# Patient Record
Sex: MALE | Race: WHITE | Employment: OTHER | URBAN - METROPOLITAN AREA
[De-identification: names, ages, dates, MRNs, and addresses within clinical notes are randomized per-mention and may not be internally consistent; named-entity substitution may affect disease eponyms.]

---

## 2020-10-15 ENCOUNTER — ANESTHESIA EVENT (OUTPATIENT)
Dept: ENDOSCOPY | Age: 70
DRG: 377 | End: 2020-10-15
Payer: MEDICARE

## 2020-10-15 ENCOUNTER — APPOINTMENT (OUTPATIENT)
Dept: CT IMAGING | Age: 70
DRG: 377 | End: 2020-10-15
Attending: EMERGENCY MEDICINE
Payer: MEDICARE

## 2020-10-15 ENCOUNTER — ANESTHESIA (OUTPATIENT)
Dept: ENDOSCOPY | Age: 70
DRG: 377 | End: 2020-10-15
Payer: MEDICARE

## 2020-10-15 ENCOUNTER — APPOINTMENT (OUTPATIENT)
Dept: GENERAL RADIOLOGY | Age: 70
DRG: 377 | End: 2020-10-15
Attending: EMERGENCY MEDICINE
Payer: MEDICARE

## 2020-10-15 ENCOUNTER — HOSPITAL ENCOUNTER (INPATIENT)
Age: 70
LOS: 5 days | Discharge: HOME | DRG: 377 | End: 2020-10-20
Attending: EMERGENCY MEDICINE | Admitting: INTERNAL MEDICINE
Payer: MEDICARE

## 2020-10-15 DIAGNOSIS — R55 SYNCOPE, UNSPECIFIED SYNCOPE TYPE: ICD-10-CM

## 2020-10-15 DIAGNOSIS — I25.5 ISCHEMIC CARDIOMYOPATHY: ICD-10-CM

## 2020-10-15 DIAGNOSIS — D64.9 SYMPTOMATIC ANEMIA: ICD-10-CM

## 2020-10-15 DIAGNOSIS — I25.10 CORONARY ARTERY DISEASE INVOLVING NATIVE HEART WITHOUT ANGINA PECTORIS, UNSPECIFIED VESSEL OR LESION TYPE: ICD-10-CM

## 2020-10-15 DIAGNOSIS — D64.9 ANEMIA, UNSPECIFIED TYPE: ICD-10-CM

## 2020-10-15 DIAGNOSIS — K92.2 GASTROINTESTINAL HEMORRHAGE, UNSPECIFIED GASTROINTESTINAL HEMORRHAGE TYPE: Primary | ICD-10-CM

## 2020-10-15 DIAGNOSIS — I25.110 CORONARY ARTERY DISEASE INVOLVING NATIVE CORONARY ARTERY OF NATIVE HEART WITH UNSTABLE ANGINA PECTORIS (HCC): ICD-10-CM

## 2020-10-15 DIAGNOSIS — K26.4 GASTROINTESTINAL HEMORRHAGE ASSOCIATED WITH DUODENAL ULCER: ICD-10-CM

## 2020-10-15 DIAGNOSIS — Z99.2 ESRD ON HEMODIALYSIS (HCC): ICD-10-CM

## 2020-10-15 DIAGNOSIS — N18.6 ESRD (END STAGE RENAL DISEASE) (HCC): ICD-10-CM

## 2020-10-15 DIAGNOSIS — N18.6 ESRD ON HEMODIALYSIS (HCC): ICD-10-CM

## 2020-10-15 PROBLEM — R42 DIZZINESS: Status: ACTIVE | Noted: 2020-10-15

## 2020-10-15 LAB
ALBUMIN SERPL-MCNC: 3 G/DL (ref 3.4–5)
ALBUMIN/GLOB SERPL: 1 {RATIO} (ref 0.8–1.7)
ALP SERPL-CCNC: 53 U/L (ref 45–117)
ALT SERPL-CCNC: 9 U/L (ref 16–61)
ANION GAP SERPL CALC-SCNC: 14 MMOL/L (ref 3–18)
APTT PPP: 25.1 SEC (ref 23–36.4)
AST SERPL-CCNC: 36 U/L (ref 10–38)
ATRIAL RATE: 93 BPM
ATRIAL RATE: 94 BPM
BASOPHILS # BLD: 0 K/UL (ref 0–0.1)
BASOPHILS NFR BLD: 0 % (ref 0–2)
BILIRUB SERPL-MCNC: 0.7 MG/DL (ref 0.2–1)
BUN SERPL-MCNC: 66 MG/DL (ref 7–18)
BUN/CREAT SERPL: 6 (ref 12–20)
CALCIUM SERPL-MCNC: 8.6 MG/DL (ref 8.5–10.1)
CALCULATED P AXIS, ECG09: 83 DEGREES
CALCULATED P AXIS, ECG09: 85 DEGREES
CALCULATED R AXIS, ECG10: -132 DEGREES
CALCULATED R AXIS, ECG10: 167 DEGREES
CALCULATED T AXIS, ECG11: -171 DEGREES
CALCULATED T AXIS, ECG11: -177 DEGREES
CHLORIDE SERPL-SCNC: 96 MMOL/L (ref 100–111)
CO2 SERPL-SCNC: 24 MMOL/L (ref 21–32)
COVID-19 RAPID TEST, COVR: NOT DETECTED
CREAT SERPL-MCNC: 10.4 MG/DL (ref 0.6–1.3)
DIAGNOSIS, 93000: NORMAL
DIAGNOSIS, 93000: NORMAL
DIFFERENTIAL METHOD BLD: ABNORMAL
EOSINOPHIL # BLD: 0 K/UL (ref 0–0.4)
EOSINOPHIL NFR BLD: 0 % (ref 0–5)
ERYTHROCYTE [DISTWIDTH] IN BLOOD BY AUTOMATED COUNT: 20.8 % (ref 11.6–14.5)
GLOBULIN SER CALC-MCNC: 3.1 G/DL (ref 2–4)
GLUCOSE BLD STRIP.AUTO-MCNC: 154 MG/DL (ref 70–110)
GLUCOSE BLD STRIP.AUTO-MCNC: 305 MG/DL (ref 70–110)
GLUCOSE BLD STRIP.AUTO-MCNC: 307 MG/DL (ref 70–110)
GLUCOSE SERPL-MCNC: 329 MG/DL (ref 74–99)
HCT VFR BLD AUTO: 20.5 % (ref 36–48)
HCT VFR BLD AUTO: 22 % (ref 36–48)
HCT VFR BLD AUTO: 23.1 % (ref 36–48)
HCT VFR BLD AUTO: 24.6 % (ref 36–48)
HEMOCCULT STL QL: POSITIVE
HGB BLD-MCNC: 6.7 G/DL (ref 13–16)
HGB BLD-MCNC: 6.9 G/DL (ref 13–16)
HGB BLD-MCNC: 7.5 G/DL (ref 13–16)
HGB BLD-MCNC: 7.8 G/DL (ref 13–16)
HISTORY CHECKED?,CKHIST: NORMAL
HISTORY CHECKED?,CKHIST: NORMAL
INR PPP: 1.2 (ref 0.8–1.2)
LIPASE SERPL-CCNC: 404 U/L (ref 73–393)
LYMPHOCYTES # BLD: 1.3 K/UL (ref 0.9–3.6)
LYMPHOCYTES NFR BLD: 17 % (ref 21–52)
MCH RBC QN AUTO: 30.7 PG (ref 24–34)
MCHC RBC AUTO-ENTMCNC: 31.4 G/DL (ref 31–37)
MCV RBC AUTO: 97.8 FL (ref 74–97)
MONOCYTES # BLD: 0.2 K/UL (ref 0.05–1.2)
MONOCYTES NFR BLD: 3 % (ref 3–10)
NEUTS SEG # BLD: 6.1 K/UL (ref 1.8–8)
NEUTS SEG NFR BLD: 80 % (ref 40–73)
P-R INTERVAL, ECG05: 156 MS
P-R INTERVAL, ECG05: 156 MS
PLATELET # BLD AUTO: 141 K/UL (ref 135–420)
PLATELET COMMENTS,PCOM: ABNORMAL
PMV BLD AUTO: 9.7 FL (ref 9.2–11.8)
POTASSIUM SERPL-SCNC: 4 MMOL/L (ref 3.5–5.5)
PROT SERPL-MCNC: 6.1 G/DL (ref 6.4–8.2)
PROTHROMBIN TIME: 14.9 SEC (ref 11.5–15.2)
Q-T INTERVAL, ECG07: 338 MS
Q-T INTERVAL, ECG07: 350 MS
QRS DURATION, ECG06: 104 MS
QRS DURATION, ECG06: 106 MS
QTC CALCULATION (BEZET), ECG08: 422 MS
QTC CALCULATION (BEZET), ECG08: 435 MS
RBC # BLD AUTO: 2.25 M/UL (ref 4.7–5.5)
RBC MORPH BLD: ABNORMAL
SODIUM SERPL-SCNC: 134 MMOL/L (ref 136–145)
SOURCE, COVRS: NORMAL
SPECIMEN TYPE, XMCV1T: NORMAL
VENTRICULAR RATE, ECG03: 93 BPM
VENTRICULAR RATE, ECG03: 94 BPM
WBC # BLD AUTO: 7.6 K/UL (ref 4.6–13.2)

## 2020-10-15 PROCEDURE — 85730 THROMBOPLASTIN TIME PARTIAL: CPT

## 2020-10-15 PROCEDURE — 00731 ANES UPR GI NDSC PX NOS: CPT | Performed by: ANESTHESIOLOGY

## 2020-10-15 PROCEDURE — 74011250637 HC RX REV CODE- 250/637: Performed by: INTERNAL MEDICINE

## 2020-10-15 PROCEDURE — 74011000250 HC RX REV CODE- 250: Performed by: ANESTHESIOLOGY

## 2020-10-15 PROCEDURE — 87635 SARS-COV-2 COVID-19 AMP PRB: CPT

## 2020-10-15 PROCEDURE — 30233N1 TRANSFUSION OF NONAUTOLOGOUS RED BLOOD CELLS INTO PERIPHERAL VEIN, PERCUTANEOUS APPROACH: ICD-10-PCS | Performed by: INTERNAL MEDICINE

## 2020-10-15 PROCEDURE — 74011250636 HC RX REV CODE- 250/636: Performed by: INTERNAL MEDICINE

## 2020-10-15 PROCEDURE — 99223 1ST HOSP IP/OBS HIGH 75: CPT | Performed by: INTERNAL MEDICINE

## 2020-10-15 PROCEDURE — 96374 THER/PROPH/DIAG INJ IV PUSH: CPT

## 2020-10-15 PROCEDURE — 83690 ASSAY OF LIPASE: CPT

## 2020-10-15 PROCEDURE — 74011636637 HC RX REV CODE- 636/637: Performed by: INTERNAL MEDICINE

## 2020-10-15 PROCEDURE — 74011636637 HC RX REV CODE- 636/637: Performed by: ANESTHESIOLOGY

## 2020-10-15 PROCEDURE — 2709999900 HC NON-CHARGEABLE SUPPLY: Performed by: INTERNAL MEDICINE

## 2020-10-15 PROCEDURE — 77030010857 HC CATH PRB GLD BSC -C: Performed by: INTERNAL MEDICINE

## 2020-10-15 PROCEDURE — 36415 COLL VENOUS BLD VENIPUNCTURE: CPT

## 2020-10-15 PROCEDURE — 85018 HEMOGLOBIN: CPT

## 2020-10-15 PROCEDURE — 76060000031 HC ANESTHESIA FIRST 0.5 HR: Performed by: INTERNAL MEDICINE

## 2020-10-15 PROCEDURE — 93005 ELECTROCARDIOGRAM TRACING: CPT

## 2020-10-15 PROCEDURE — 86900 BLOOD TYPING SEROLOGIC ABO: CPT

## 2020-10-15 PROCEDURE — 82272 OCCULT BLD FECES 1-3 TESTS: CPT

## 2020-10-15 PROCEDURE — C9113 INJ PANTOPRAZOLE SODIUM, VIA: HCPCS | Performed by: EMERGENCY MEDICINE

## 2020-10-15 PROCEDURE — C9113 INJ PANTOPRAZOLE SODIUM, VIA: HCPCS | Performed by: INTERNAL MEDICINE

## 2020-10-15 PROCEDURE — P9016 RBC LEUKOCYTES REDUCED: HCPCS

## 2020-10-15 PROCEDURE — 0W3P8ZZ CONTROL BLEEDING IN GASTROINTESTINAL TRACT, VIA NATURAL OR ARTIFICIAL OPENING ENDOSCOPIC: ICD-10-PCS | Performed by: INTERNAL MEDICINE

## 2020-10-15 PROCEDURE — 99285 EMERGENCY DEPT VISIT HI MDM: CPT

## 2020-10-15 PROCEDURE — 85025 COMPLETE CBC W/AUTO DIFF WBC: CPT

## 2020-10-15 PROCEDURE — 74011250636 HC RX REV CODE- 250/636: Performed by: ANESTHESIOLOGY

## 2020-10-15 PROCEDURE — 74011636637 HC RX REV CODE- 636/637: Performed by: EMERGENCY MEDICINE

## 2020-10-15 PROCEDURE — 82962 GLUCOSE BLOOD TEST: CPT

## 2020-10-15 PROCEDURE — 85610 PROTHROMBIN TIME: CPT

## 2020-10-15 PROCEDURE — 74011000250 HC RX REV CODE- 250: Performed by: INTERNAL MEDICINE

## 2020-10-15 PROCEDURE — 77030008565 HC TBNG SUC IRR ERBE -B: Performed by: INTERNAL MEDICINE

## 2020-10-15 PROCEDURE — 70450 CT HEAD/BRAIN W/O DYE: CPT

## 2020-10-15 PROCEDURE — 65660000004 HC RM CVT STEPDOWN

## 2020-10-15 PROCEDURE — 76040000019: Performed by: INTERNAL MEDICINE

## 2020-10-15 PROCEDURE — 74011250636 HC RX REV CODE- 250/636: Performed by: EMERGENCY MEDICINE

## 2020-10-15 PROCEDURE — 99100 ANES PT EXTEME AGE<1 YR&>70: CPT | Performed by: ANESTHESIOLOGY

## 2020-10-15 PROCEDURE — 80053 COMPREHEN METABOLIC PANEL: CPT

## 2020-10-15 PROCEDURE — 71045 X-RAY EXAM CHEST 1 VIEW: CPT

## 2020-10-15 PROCEDURE — 96375 TX/PRO/DX INJ NEW DRUG ADDON: CPT

## 2020-10-15 PROCEDURE — 36430 TRANSFUSION BLD/BLD COMPNT: CPT

## 2020-10-15 PROCEDURE — 86920 COMPATIBILITY TEST SPIN: CPT

## 2020-10-15 RX ORDER — CARVEDILOL 6.25 MG/1
6.25 TABLET ORAL 2 TIMES DAILY WITH MEALS
COMMUNITY

## 2020-10-15 RX ORDER — MAGNESIUM SULFATE 100 %
4 CRYSTALS MISCELLANEOUS AS NEEDED
Status: DISCONTINUED | OUTPATIENT
Start: 2020-10-15 | End: 2020-10-20 | Stop reason: HOSPADM

## 2020-10-15 RX ORDER — SODIUM CHLORIDE 9 MG/ML
250 INJECTION, SOLUTION INTRAVENOUS AS NEEDED
Status: DISCONTINUED | OUTPATIENT
Start: 2020-10-15 | End: 2020-10-16

## 2020-10-15 RX ORDER — DEXTROMETHORPHAN/PSEUDOEPHED 2.5-7.5/.8
DROPS ORAL AS NEEDED
Status: DISCONTINUED | OUTPATIENT
Start: 2020-10-15 | End: 2020-10-15 | Stop reason: HOSPADM

## 2020-10-15 RX ORDER — INSULIN LISPRO 100 [IU]/ML
INJECTION, SOLUTION INTRAVENOUS; SUBCUTANEOUS ONCE
Status: COMPLETED | OUTPATIENT
Start: 2020-10-15 | End: 2020-10-15

## 2020-10-15 RX ORDER — ASPIRIN 81 MG/1
81 TABLET ORAL DAILY
COMMUNITY

## 2020-10-15 RX ORDER — FLUTICASONE FUROATE AND VILANTEROL TRIFENATATE 100; 25 UG/1; UG/1
1 POWDER RESPIRATORY (INHALATION) DAILY
COMMUNITY

## 2020-10-15 RX ORDER — SODIUM CHLORIDE 0.9 % (FLUSH) 0.9 %
5-40 SYRINGE (ML) INJECTION EVERY 8 HOURS
Status: DISCONTINUED | OUTPATIENT
Start: 2020-10-15 | End: 2020-10-15 | Stop reason: HOSPADM

## 2020-10-15 RX ORDER — ACETAMINOPHEN 650 MG/1
650 SUPPOSITORY RECTAL
Status: DISCONTINUED | OUTPATIENT
Start: 2020-10-15 | End: 2020-10-20 | Stop reason: HOSPADM

## 2020-10-15 RX ORDER — CLOPIDOGREL BISULFATE 75 MG/1
75 TABLET ORAL DAILY
COMMUNITY

## 2020-10-15 RX ORDER — SODIUM CHLORIDE 0.9 % (FLUSH) 0.9 %
5-40 SYRINGE (ML) INJECTION EVERY 8 HOURS
Status: DISCONTINUED | OUTPATIENT
Start: 2020-10-15 | End: 2020-10-20 | Stop reason: HOSPADM

## 2020-10-15 RX ORDER — IPRATROPIUM BROMIDE AND ALBUTEROL SULFATE 2.5; .5 MG/3ML; MG/3ML
3 SOLUTION RESPIRATORY (INHALATION)
COMMUNITY

## 2020-10-15 RX ORDER — PROPOFOL 10 MG/ML
INJECTION, EMULSION INTRAVENOUS AS NEEDED
Status: DISCONTINUED | OUTPATIENT
Start: 2020-10-15 | End: 2020-10-15 | Stop reason: HOSPADM

## 2020-10-15 RX ORDER — INSULIN LISPRO 100 [IU]/ML
INJECTION, SOLUTION INTRAVENOUS; SUBCUTANEOUS ONCE
Status: DISCONTINUED | OUTPATIENT
Start: 2020-10-15 | End: 2020-10-15

## 2020-10-15 RX ORDER — ISOSORBIDE MONONITRATE 30 MG/1
30 TABLET, EXTENDED RELEASE ORAL DAILY
COMMUNITY

## 2020-10-15 RX ORDER — SODIUM CHLORIDE 0.9 % (FLUSH) 0.9 %
5-40 SYRINGE (ML) INJECTION AS NEEDED
Status: DISCONTINUED | OUTPATIENT
Start: 2020-10-15 | End: 2020-10-20 | Stop reason: HOSPADM

## 2020-10-15 RX ORDER — IBUPROFEN 200 MG
1 TABLET ORAL EVERY 24 HOURS
COMMUNITY

## 2020-10-15 RX ORDER — SODIUM CHLORIDE, SODIUM LACTATE, POTASSIUM CHLORIDE, CALCIUM CHLORIDE 600; 310; 30; 20 MG/100ML; MG/100ML; MG/100ML; MG/100ML
25 INJECTION, SOLUTION INTRAVENOUS CONTINUOUS
Status: DISCONTINUED | OUTPATIENT
Start: 2020-10-15 | End: 2020-10-19

## 2020-10-15 RX ORDER — IPRATROPIUM BROMIDE AND ALBUTEROL SULFATE 2.5; .5 MG/3ML; MG/3ML
3 SOLUTION RESPIRATORY (INHALATION)
Status: DISCONTINUED | OUTPATIENT
Start: 2020-10-15 | End: 2020-10-20 | Stop reason: HOSPADM

## 2020-10-15 RX ORDER — LANOLIN ALCOHOL/MO/W.PET/CERES
1000 CREAM (GRAM) TOPICAL DAILY
COMMUNITY

## 2020-10-15 RX ORDER — FAMOTIDINE 20 MG/1
20 TABLET, FILM COATED ORAL 2 TIMES DAILY
COMMUNITY
End: 2020-10-20

## 2020-10-15 RX ORDER — INSULIN LISPRO 100 [IU]/ML
INJECTION, SOLUTION INTRAVENOUS; SUBCUTANEOUS
Status: DISCONTINUED | OUTPATIENT
Start: 2020-10-15 | End: 2020-10-15

## 2020-10-15 RX ORDER — SODIUM CHLORIDE, SODIUM LACTATE, POTASSIUM CHLORIDE, CALCIUM CHLORIDE 600; 310; 30; 20 MG/100ML; MG/100ML; MG/100ML; MG/100ML
25 INJECTION, SOLUTION INTRAVENOUS CONTINUOUS
Status: DISCONTINUED | OUTPATIENT
Start: 2020-10-15 | End: 2020-10-15 | Stop reason: HOSPADM

## 2020-10-15 RX ORDER — BUDESONIDE 0.5 MG/2ML
500 INHALANT ORAL
Status: DISCONTINUED | OUTPATIENT
Start: 2020-10-15 | End: 2020-10-20 | Stop reason: HOSPADM

## 2020-10-15 RX ORDER — ATORVASTATIN CALCIUM 80 MG/1
80 TABLET, FILM COATED ORAL DAILY
COMMUNITY

## 2020-10-15 RX ORDER — POLYETHYLENE GLYCOL 3350 17 G/17G
17 POWDER, FOR SOLUTION ORAL DAILY PRN
Status: DISCONTINUED | OUTPATIENT
Start: 2020-10-15 | End: 2020-10-20 | Stop reason: HOSPADM

## 2020-10-15 RX ORDER — LIDOCAINE HYDROCHLORIDE 20 MG/ML
INJECTION, SOLUTION EPIDURAL; INFILTRATION; INTRACAUDAL; PERINEURAL AS NEEDED
Status: DISCONTINUED | OUTPATIENT
Start: 2020-10-15 | End: 2020-10-15 | Stop reason: HOSPADM

## 2020-10-15 RX ORDER — SUCRALFATE 1 G/1
1 TABLET ORAL EVERY 6 HOURS
Status: DISCONTINUED | OUTPATIENT
Start: 2020-10-15 | End: 2020-10-20 | Stop reason: HOSPADM

## 2020-10-15 RX ORDER — POLYETHYLENE GLYCOL 3350 17 G/17G
17 POWDER, FOR SOLUTION ORAL DAILY
COMMUNITY

## 2020-10-15 RX ORDER — ATORVASTATIN CALCIUM 40 MG/1
80 TABLET, FILM COATED ORAL
Status: DISCONTINUED | OUTPATIENT
Start: 2020-10-15 | End: 2020-10-20 | Stop reason: HOSPADM

## 2020-10-15 RX ORDER — LORATADINE 10 MG/1
10 TABLET ORAL DAILY
COMMUNITY

## 2020-10-15 RX ORDER — PROMETHAZINE HYDROCHLORIDE 25 MG/1
12.5 TABLET ORAL
Status: DISCONTINUED | OUTPATIENT
Start: 2020-10-15 | End: 2020-10-20 | Stop reason: HOSPADM

## 2020-10-15 RX ORDER — GUAIFENESIN 600 MG/1
600 TABLET, EXTENDED RELEASE ORAL 2 TIMES DAILY
COMMUNITY

## 2020-10-15 RX ORDER — EPINEPHRINE 0.1 MG/ML
INJECTION INTRACARDIAC; INTRAVENOUS AS NEEDED
Status: DISCONTINUED | OUTPATIENT
Start: 2020-10-15 | End: 2020-10-15 | Stop reason: HOSPADM

## 2020-10-15 RX ORDER — INSULIN GLARGINE 100 [IU]/ML
5 INJECTION, SOLUTION SUBCUTANEOUS
Status: DISCONTINUED | OUTPATIENT
Start: 2020-10-15 | End: 2020-10-19

## 2020-10-15 RX ORDER — GLUCOSAMINE/CHONDR SU A SOD 167-133 MG
2500 CAPSULE ORAL 2 TIMES DAILY WITH MEALS
COMMUNITY

## 2020-10-15 RX ORDER — INSULIN GLARGINE 100 [IU]/ML
10 INJECTION, SOLUTION SUBCUTANEOUS
COMMUNITY

## 2020-10-15 RX ORDER — CLINDAMYCIN HYDROCHLORIDE 300 MG/1
300 CAPSULE ORAL 4 TIMES DAILY
COMMUNITY
End: 2020-10-20

## 2020-10-15 RX ORDER — SODIUM CHLORIDE 0.9 % (FLUSH) 0.9 %
5-40 SYRINGE (ML) INJECTION AS NEEDED
Status: DISCONTINUED | OUTPATIENT
Start: 2020-10-15 | End: 2020-10-15 | Stop reason: HOSPADM

## 2020-10-15 RX ORDER — DEXTROSE 50 % IN WATER (D50W) INTRAVENOUS SYRINGE
25-50 AS NEEDED
Status: DISCONTINUED | OUTPATIENT
Start: 2020-10-15 | End: 2020-10-20 | Stop reason: HOSPADM

## 2020-10-15 RX ORDER — PANTOPRAZOLE SODIUM 40 MG/10ML
80 INJECTION, POWDER, LYOPHILIZED, FOR SOLUTION INTRAVENOUS
Status: COMPLETED | OUTPATIENT
Start: 2020-10-15 | End: 2020-10-15

## 2020-10-15 RX ORDER — ONDANSETRON 2 MG/ML
4 INJECTION INTRAMUSCULAR; INTRAVENOUS
Status: DISCONTINUED | OUTPATIENT
Start: 2020-10-15 | End: 2020-10-20 | Stop reason: HOSPADM

## 2020-10-15 RX ORDER — ACETAMINOPHEN 325 MG/1
650 TABLET ORAL
Status: DISCONTINUED | OUTPATIENT
Start: 2020-10-15 | End: 2020-10-20 | Stop reason: HOSPADM

## 2020-10-15 RX ORDER — INSULIN LISPRO 100 [IU]/ML
INJECTION, SOLUTION INTRAVENOUS; SUBCUTANEOUS EVERY 6 HOURS
Status: DISCONTINUED | OUTPATIENT
Start: 2020-10-16 | End: 2020-10-18

## 2020-10-15 RX ORDER — ONDANSETRON 2 MG/ML
4 INJECTION INTRAMUSCULAR; INTRAVENOUS
Status: COMPLETED | OUTPATIENT
Start: 2020-10-15 | End: 2020-10-15

## 2020-10-15 RX ADMIN — PROPOFOL 80 MG: 10 INJECTION, EMULSION INTRAVENOUS at 15:26

## 2020-10-15 RX ADMIN — ATORVASTATIN CALCIUM 80 MG: 40 TABLET, FILM COATED ORAL at 21:15

## 2020-10-15 RX ADMIN — LIDOCAINE HYDROCHLORIDE 20 MG: 20 INJECTION, SOLUTION EPIDURAL; INFILTRATION; INTRACAUDAL; PERINEURAL at 15:26

## 2020-10-15 RX ADMIN — SUCRALFATE 1 G: 1 TABLET ORAL at 21:15

## 2020-10-15 RX ADMIN — SODIUM CHLORIDE, SODIUM LACTATE, POTASSIUM CHLORIDE, AND CALCIUM CHLORIDE 25 ML/HR: 600; 310; 30; 20 INJECTION, SOLUTION INTRAVENOUS at 15:01

## 2020-10-15 RX ADMIN — SODIUM CHLORIDE 10 ML: 9 INJECTION, SOLUTION INTRAMUSCULAR; INTRAVENOUS; SUBCUTANEOUS at 21:23

## 2020-10-15 RX ADMIN — SODIUM CHLORIDE 40 MG: 9 INJECTION INTRAMUSCULAR; INTRAVENOUS; SUBCUTANEOUS at 21:12

## 2020-10-15 RX ADMIN — SODIUM CHLORIDE 10 ML: 9 INJECTION, SOLUTION INTRAMUSCULAR; INTRAVENOUS; SUBCUTANEOUS at 06:50

## 2020-10-15 RX ADMIN — SODIUM CHLORIDE 40 MG: 9 INJECTION INTRAMUSCULAR; INTRAVENOUS; SUBCUTANEOUS at 10:39

## 2020-10-15 RX ADMIN — INSULIN GLARGINE 5 UNITS: 100 INJECTION, SOLUTION SUBCUTANEOUS at 21:26

## 2020-10-15 RX ADMIN — ATORVASTATIN CALCIUM 80 MG: 40 TABLET, FILM COATED ORAL at 06:15

## 2020-10-15 RX ADMIN — Medication 10 ML: at 06:15

## 2020-10-15 RX ADMIN — PROPOFOL 80 MG: 10 INJECTION, EMULSION INTRAVENOUS at 15:35

## 2020-10-15 RX ADMIN — INSULIN LISPRO 8 UNITS: 100 INJECTION, SOLUTION INTRAVENOUS; SUBCUTANEOUS at 14:50

## 2020-10-15 RX ADMIN — PANTOPRAZOLE SODIUM 80 MG: 40 INJECTION, POWDER, FOR SOLUTION INTRAVENOUS at 05:07

## 2020-10-15 RX ADMIN — INSULIN LISPRO 8 UNITS: 100 INJECTION, SOLUTION INTRAVENOUS; SUBCUTANEOUS at 19:05

## 2020-10-15 RX ADMIN — ONDANSETRON 4 MG: 2 INJECTION INTRAMUSCULAR; INTRAVENOUS at 02:50

## 2020-10-15 NOTE — CONSULTS
[facility] - Consult Note    Consultation request by Ewa Goncalves MD for advice/opinion related to evaluating GIB (gastrointestinal bleeding) [K92.2]  Anemia [D64.9]  Dizziness [R42]    Date of  Admission: 10/15/2020  2:01 AM   Primary Care Physician:  Sherry Rodrigues DO     Assessment:     Patient Active Problem List   Diagnosis Code    Dizziness R42    Anemia D64.9    GIB (gastrointestinal bleeding) K92.2       -Acute symptomatic blood loss anemia with Hgb 6.9 requiring transfusion, presented with progressive weakness and near syncope. -GIB with occult positive stool, plan for GI evaluation with EGD today. On ASA and Plavix as outpatient. Known recent anemia with heme negative stool at [facility]. -CAD s/p CABG 1990s (LIMA to LAD, SVG to RCA, SVG to OM/PLV) with recent STEMI requiring PCI to LIMA to LAD with JHONATHAN 9/23/2020 (known chronically occluded SVG to RCA, known 30% disease at anastomotic site of SVG to OM/PLV)  -Ischemic CMY with EF 25% by echo 10/1/2020 (15% 9/23/2020, 50% 7/2020). Plan for outpatient discussion of AICD, was not fitted for lifvest.  -ESRD recently started on HD following recent heart catheterization, possible cardiorenal syndrome versus/contrast induced SKYLER/ATN in the setting of diuresis. Patient reports this is hopefully temporary.  -Chronic HFrEF. -Chronic anemia.  -Hypertension.  -Dyslipidemia. -Diabetes mellitus. -COPD. -Tobacco use. -Poor dentition. Recent extraction. Primary cardiologist Dr. Yunior Min. Plan:     Independently seen and evaluated. Agree with below. Unfortunate situation with recent intervention involving LIMA to LAD at [facility]. His EF is significantly diminished with 25% by echo on 10/1/2020. His risk of acute thrombosis of his JHONATHAN is quite high off of dual platelet anticoagulation. Because of his severe anemia and GI bleed, it is currently being held.   Dual antiplatelet therapy needs to be resumed as soon as possible in light of his recent intervention and other residual severe CAD and ischemic cardiomyopathy. Continue to transfusion watching Hgb response. Appreciate GI involvement, plan for EGD today. Need to resume plavix and ASA ASAP given recent JHONATHAN. Continue volume management with HD. Continued on statin. Resume BB when BP will allow. No ace/arb/diuretics given recent SKYLER resulting in HD which patient states is hopefully temporary. History of Present Illness: This is a 65 y.o. male admitted for GIB (gastrointestinal bleeding) [K92.2]  Anemia [D64.9]  Dizziness [R42]. Patient complains of:  Weakness, dizziness. Patient reports he has been feeling weak and tired lately. He was then getting dizzy and felt he was going to pass out. He went to ER and Hgb was 6.9 and stool was positive for blood. Patient is to have EGD today. Patient does have known CAD/CABG with recent STEMI requiring PCI to LIMA to LAD with JHONATHAN. Patient had during that admission he developed renal failure which required HD which patients states is hopefully temporary. Patient did have anemia at [facility] but stool was reportedly negative. Cardiac risk factors: smoking/ tobacco exposure, dyslipidemia, diabetes mellitus, hypertension    Cath  9/23/2020  CORONARY ANGIOGRAPHY:   LM: Severe calcification with 60 to 70% stenosis   LAD: DIFFUSE PROXIMAL DISEASE WITH TOTAL OCCLUSION AT THE ORIGIN OF THE FIRST SEPTAL . LEFT CX: 100% Totally occluded at the proximal segment  RCA: The right coronary artery is a dominant vessel totally occluded proximally. It fills partially from collaterals from the conus and marginal branch and distally from left to right collaterals. SVG to RCA-totally occluded at the origin  SVG to OM and PL branches-this is a sequential graft which is attached to OM and PLV branch and has 30% disease seen in the prior cath at the anastomotic site.   Graft is patent in the body and origin  LIMA to LAD: Widely patent at the origin and in the body however at the anastomotic site it had 100% thrombotic occlusion        Echo 10/1/2020  LIMITED STUDY FOR ASSESSMENT OF SYSTOLIC FUNCTION. DEFINITY CONTRAST UTILIZED TO ENHANCE QUALITY OF IMAGES. INCREASED LEFT VENTRICULAR CAVITY SIZE (6 CM). MILD LEFT VENTRICULAR SEPTAL HYPERTROPHY. SEVERELY REDUCED LEFT VENTRICULAR SYSTOLIC FUNCTION WITH AN ESTIMATED EJECTION FRACTION OF   25%. BEST PRESERVED FUNCTION IN THE BASAL TO MID LATERAL AND ANTERIOR WALLS WITH SEVERE   HYPOKINESIS OF ALL REMAINING SEGMENTS. SEVERELY DILATED LEFT ATRIUM. NORMAL RIGHT VENTRICULAR SIZE. MILD TO MODERATELY REDUCED RIGHT VENTRICULAR SYSTOLIC   FUNCTION BY VISUAL  ASSESSMENT. TRACE TRICUSPID REGURGITATION. ESTIMATED PULMONARY ARTERY PRESSURE IS 49 MMHG. COMPARED TO PREVIOUS ECHO PERFORMED ON 09/23/2020: EF WAS 15% AND IS NOW ESTIMATED AT 25%. RV   SYSTOLIC FUNCTION WAS LOW NORMAL AND NOW APPEARS REDUCED. PAP WAS 30 MMHG AND IS NOW 49   MMHG. Review of Symptoms:  Except as stated above include:  Constitutional:  Reports fatigue. Respiratory:  negative  Cardiovascular: C/o dizziness. Denies CP, palp, SOB. Gastrointestinal: negative  Genitourinary:  negative  Musculoskeletal:  Negative  Neurological:  Negative  Dermatological:  Negative  Endocrinological: Negative  Psychological:  Negative         Past Medical History:   History reviewed. No pertinent past medical history. Social History:     Social History     Socioeconomic History    Marital status: SINGLE     Spouse name: Not on file    Number of children: Not on file    Years of education: Not on file    Highest education level: Not on file        Family History:   History reviewed. No pertinent family history. Medications:      Allergies   Allergen Reactions    Penicillins Hives and Itching        Current Facility-Administered Medications   Medication Dose Route Frequency    0.9% sodium chloride infusion 250 mL  250 mL IntraVENous PRN    sodium chloride (NS) flush 5-40 mL  5-40 mL IntraVENous Q8H    sodium chloride (NS) flush 5-40 mL  5-40 mL IntraVENous PRN    acetaminophen (TYLENOL) tablet 650 mg  650 mg Oral Q6H PRN    Or    acetaminophen (TYLENOL) suppository 650 mg  650 mg Rectal Q6H PRN    polyethylene glycol (MIRALAX) packet 17 g  17 g Oral DAILY PRN    promethazine (PHENERGAN) tablet 12.5 mg  12.5 mg Oral Q6H PRN    Or    ondansetron (ZOFRAN) injection 4 mg  4 mg IntraVENous Q6H PRN    pantoprazole (PROTONIX) 40 mg in 0.9% sodium chloride 10 mL injection  40 mg IntraVENous Q12H    insulin lispro (HUMALOG) injection   SubCUTAneous AC&HS    glucose chewable tablet 16 g  4 Tab Oral PRN    glucagon (GLUCAGEN) injection 1 mg  1 mg IntraMUSCular PRN    dextrose (D50W) injection syrg 12.5-25 g  25-50 mL IntraVENous PRN    atorvastatin (LIPITOR) tablet 80 mg  80 mg Oral QHS    albuterol-ipratropium (DUO-NEB) 2.5 MG-0.5 MG/3 ML  3 mL Nebulization Q4H PRN    budesonide (PULMICORT) 500 mcg/2 ml nebulizer suspension  500 mcg Nebulization BID RT     No current outpatient medications on file. Physical Exam:     Visit Vitals  BP (!) 107/51   Pulse (!) 107   Temp 97 °F (36.1 °C)   Resp 17   SpO2 98%     BP Readings from Last 3 Encounters:   10/15/20 (!) 107/51     Pulse Readings from Last 3 Encounters:   10/15/20 (!) 107     Wt Readings from Last 3 Encounters:   11/21/13 97.1 kg (214 lb)       General:  alert, cooperative, no distress, appears stated age  Neck:  no JVD  Lungs:  clear to auscultation bilaterally  Heart:  regular rate and rhythm  Abdomen:  abdomen is soft without significant tenderness, masses, organomegaly or guarding  Extremities:  extremities normal, atraumatic, no cyanosis or edema  Skin: Warm and dry.  no hyperpigmentation, vitiligo, or suspicious lesions  Neuro: alert, oriented x3, affect appropriate  Psych: non focal     Data Review:     Recent Labs     10/15/20  0600 10/15/20  0230   WBC  --  7.6   HGB 7. 8* 6.9*   HCT 24.6* 22.0*   PLT  --  141     Recent Labs     10/15/20  0230   *   K 4.0   CL 96*   CO2 24   *   BUN 66*   CREA 10.40*   CA 8.6   ALB 3.0*   ALT 9*   INR 1.2       Results for orders placed or performed during the hospital encounter of 10/15/20   EKG, 12 LEAD, INITIAL   Result Value Ref Range    Ventricular Rate 93 BPM    Atrial Rate 93 BPM    P-R Interval 156 ms    QRS Duration 104 ms    Q-T Interval 350 ms    QTC Calculation (Bezet) 435 ms    Calculated P Axis 83 degrees    Calculated R Axis -132 degrees    Calculated T Axis -171 degrees    Diagnosis       Sinus rhythm with premature atrial complexes  Right superior axis deviation  Pulmonary disease pattern  ST & T wave abnormality, consider inferior ischemia  Abnormal ECG  No previous ECGs available         All Cardiac Markers in the last 24 hours:  No results found for: CPK, CK, CKMMB, CKMB, RCK3, CKMBT, CKNDX, CKND1, SAYRA, TROPT, TROIQ, RAVEN, TROPT, TNIPOC, BNP, BNPP    Last Lipid:    Lab Results   Component Value Date/Time    Cholesterol, total 89 11/19/2013 11:33 AM    HDL Cholesterol 29 (L) 11/19/2013 11:33 AM    LDL, calculated 47.6 11/19/2013 11:33 AM    Triglyceride 62 11/19/2013 11:33 AM    CHOL/HDL Ratio 3.1 11/19/2013 11:33 AM       Signed By: MANDI Arana     October 15, 2020

## 2020-10-15 NOTE — PROGRESS NOTES
TRANSFER - IN REPORT:    Verbal report received from [facility] RN(name) on [employer]  being received from ED(unit) for ordered procedure      Report consisted of patients Situation, Background, Assessment and   Recommendations(SBAR). Information from the following report(s) SBAR, K[facility], [facility] and Recent Results was reviewed with the receiving nurse. Opportunity for questions and clarification was provided. Assessment completed upon patients arrival to unit and care assumed.

## 2020-10-15 NOTE — PROCEDURES
[facility]  [facility], [address]     Endoscopic Gastroduodenoscopy Procedure Note    Argelia Anedrson  1950  [id number]    Indication:  Melena/hematochezia     : Arthur Simmons MD    Referring Provider:  Gael Aguilera DO    Anesthesia/Sedation:  MAC anesthesia Propofol      Procedure Details     After infomed consent was obtained for the procedure, with all risks and benefits of procedure explained the patient was taken to the endoscopy suite and placed in the left lateral decubitus position. Following sequential administration of sedation as per above, the endoscope was inserted into the mouth and advanced under direct vision to third portion of the duodenum. A careful inspection was made as the gastroscope was withdrawn, including a retroflexed view of the proximal stomach; findings and interventions are described below. Findings:   Esophagus:normal  Stomach: normal   Duodenum/jejunum: duodenal ulcer w/ adherent clot oozing BRB    Therapies:  3  Cc of 1: 10k epin injected. Bicap applied and bleeding ceased     Specimens: * No specimens in log *           Complications:   None; patient tolerated the procedure well. EBL:  None. Impression:    Dudonenal Ulcer (actively bleeding)      Recommendations:  -Continue acid suppression. , -Keep NPO except ice chip, -No NSAIDS  carafate suspension  Check stool for H pylori  Hold Plavix for at least 2-4 days  Check stool for H pylori, Rx if +  Arthur Simmons MD  10/15/2020  3:50 PM

## 2020-10-15 NOTE — CONSULTS
Noted consultation for ESRD on HD MWF , orders placed, consult note to follow    65years old presented with weakness, fall, dizziness and bleeding per rectum, ac blood loss anemia ,ESRD on HD MWF, missed HD yesterday , admitted with Gi bleed. Electrolyte, acid base and volume status Do not warrant dialysis today . Defer PRBC Tx to primary service. Follow GI recs.  Formal consult note to follow    Please call with questions,    Thomes Kayser, MD [facility]  Cell [phone]  Pager: [phone]

## 2020-10-15 NOTE — PROGRESS NOTES
Patient admitted earlier today by my colleague. Status post EGD which showed a bleeding duodenal ulcer. Aspirin and Plavix are on hold. Continue n.p.o. and PPI. Nephrology and cardiology following. Resume antiplatelet therapy when okay with GI. Monitor sugars. Sliding scale insulin. Half dose of home Lantus. Monitor hemoglobin and hematocrit.

## 2020-10-15 NOTE — H&P
Admission History and Physical    Cheko Rivera is a 65 y.o. male who is being admitted. Chief Complaint   Patient presents with    Fall    Dizziness    Rectal Bleeding       Impression/Plan     65years old presented with weakness, fall, dizziness and bleeding per rectum    -Acute on chronic anemia, secondary to acute blood loss and GIB  -Melena, while on aspirin and Plavix after recent coronary stenting 2 weeks ago  -S/p STEMI with LIMA to LAD stent 2 weeks ago at [facility]  -CAD, with history of CABG 1996  -CHF, cardiomyopathy with EF of 15-25 % 2 weeks ago  -COPD  -Renal failure, recently started on dialysis, on MWF schedule, missed dialysis yesterday. -Diabetes mellitus  -Chronic anemia  -Hypertension    The patient admitted to stepdown  Monitor serial H&H and transfuse as needed  He is receiving 1 unit of blood in the ED  IV Protonix  GI consult, per ED  Nephrology was consulted  Hold aspirin and Plavix for now  Consult cardiology in a.m. regarding antiplatelets  Sliding scale insulin, while n.p.o. and holding Lantus  Bronchodilators as needed for COPD  Hold antihypertensives including Imdur, Norvasc and diuretics for borderline hypotension  Admission plan was discussed with patient    SCD for DVT prophylaxis    Admission plan was discussed with    HPI     65years old with multiple medical problems including CAD, CABG in 1996, COPD, cardiomyopathy with EF of 15-25%, diabetes mellitus who was discharged from [facility] a week ago after 2 weeks hospitalization for STEMI requiring stenting of the LIMA to LAD graft. During hospitalization he was also noted anemic requiring blood transfusion and developed renal failure requiring dialysis and was discharged on Monday Wednesday and Friday schedule. His EF was also found to have dropped to 15% and evaluation for AICD defect outpatient follow-up and repeat echo.   He presented to the emergency department at [facility] with complaint of having severe weakness, dizziness and fall with no syncope. EMS noted that he also has rectal bleeding and was found to have hemoglobin of 6.9 in ED. His hemoglobin was 8.9 and hematocrit 29.3 on 10/8 prior to discharge from [facility]. He missed his dialysis yesterday. He is being transfused 1 unit of blood in the ED. ED consulted nephrology and GI and the patient is being admitted to stepdown for further management. He was discharged from [facility] on aspirin and Plavix that he has been taking. He denies having any recent upper or lower endoscopy. Allergies   Allergen Reactions    Penicillins Hives and Itching     Family history, hypertension  Social history, denies recreational drugs    Home Medications:   Medications list per Myrna CHANDLER on 10/9/2020    aspirin EC 81 mg PO TBEC Take 1 Tab by Mouth Once a Day. Qty: 30 Tab, Refills: 0     atorvastatin (LIPITOR) 80 mg PO TABS Take 1 Tab by Mouth Every Night at Bedtime. Qty: 30 Tab, Refills: 0     Clindamycin HCl 300 mg PO CAPS Take 1 Cap by Mouth 4 Times Daily for 6 days. Qty: 24 Cap, Refills: 0     clopidogreL (PLAVIX) 75 mg PO TABS Take 1 Tab by Mouth Once a Day. Qty: 30 Tab, Refills: 0     cyanocobalamin (VITAMIN B-12) 1,000 mcg PO TABS Take 1 Tab by Mouth Once a Day. Qty: 30 Tab, Refills: 0     famotidine (PEPCID) 20 mg PO TABS Take 1 Tab by Mouth Twice Daily. Qty: 60 Tab, Refills: 0       CONTINUE these medications which have CHANGED   Details   carvediloL (COREG) 6.25 mg PO TABS Take 1 Tab by Mouth 2 Times Daily with Meals. Qty: 60 Tab, Refills: 0     insulin glargine (LANTUS VIAL) 100 unit/mL SC SOLN Inject 10 Units beneath the skin Every Night at Bedtime. Qty: 1 Vial, Refills: 0       CONTINUE these medications which have NOT CHANGED   Details   albuterol (PROVENTIL, VENTOLIN) 2.5 mg /3 mL (0.083 %) INH NEBU Take 3 mL inhaled by mouth Every 4 Hours As Needed for Wheezing (cough/wheeze).   Qty: 50 Vial, Refills: 0     albuterol (PROVENTIL, VENTOLIN) 90 mcg/actuation INH HFAA Take 1-2 Puffs inhaled by mouth Every 4 Hours As Needed for Wheezing (wheeze). Qty: 1 Inhaler, Refills: 0     fluticasone furoate-vilanteroL (BREO) 100-25 mcg/dose INH DsDv Take 1 Puff inhaled by mouth Once a Day. Qty: 1 Inhaler, Refills: 3     fluticasone propionate (FLONASE) 50 mcg/actuation NA SpSn 1 Spray by each nostril route Once a Day. Qty: 1 Bottle, Refills: 0     guaiFENesin (HUMABID;MUCINEX) 600 mg PO Ta12 Take 1 Tab by Mouth Every 12 hours. Qty: 10 Tab, Refills: 0     isosorbide mononitrate CR (IMDUR) 30 mg PO TB24 Take 1 Tab by Mouth Once a Day. Qty: 30 Tab, Refills: 1     loratadine (CLARITIN) 10 mg PO TABS Take 1 Tab by Mouth Once a Day. Qty: 30 Tab, Refills: 1     niacin (NICOBID) 500 mg PO TABS Take 2,500 mg by Mouth Twice Daily. Take 2500mg po QAM and 2500mg po QHS     nicotine (NICODERM CQ) 21 mg/24 hr TD PT24 Apply 1 Patch as directed Every 24 Hours. Qty: 30 Patch, Refills: 0     ONE-A-DAY MENS PO Take by Mouth.     polyethylene glycol (MIRALAX) 17 gram PO PwPk Take 1 Packet by Mouth Once a Day. Qty: 1 Bottle, Refills: 0       STOP taking these medications     amLODIPine (NORVASC) 5 mg PO TABS Comments:   Reason for Stopping:     furosemide (LASIX) 20 mg PO TABS Comments:   Reason for Stopping:     glyBURIDE (DIABETA) 5 mg PO TABS Comments:   Reason for Stopping:     losartan (COZAAR) 50 mg PO TABS Comments:   Reason for Stopping:     Metformin (GLUCOPHAGE) 1,000 mg PO TABS Comments:   Reason for Stopping:     potassium chloride ER (K-DUR;KLOR-CON) 10 mEq PO TbTQ tablet Comments:   Reason for Stopping    Review of Systems:   GEN  no fever or chills  CV  no chest pain, or syncope.    PULM  No cough, No wheezing, No hemoptysis  GI  no abd pain, no vomiting     no dysuria, no flank pain   M/S- no back pain, no neck pain   SKIN  no rashes, no bruising   ENDO  no temp intolerance, no polyuria, no polydypsia   NEURO  no focal weakness, no speech changes   PSYCH  no hallucinations   HEENT  no headache, , no neck pain     Physical Assessment:     Visit Vitals  /68   Pulse (!) 107   Temp 97 °F (36.1 °C)   Resp 18   SpO2 99%     Constitutional: The patient is oriented to person, place, and time. No distress. Eyes: Pale conjunctiva, no scleral icterus. Neck: No JVD present. Cardiovascular: Regular rhythm. Exam reveals no gallop and no friction rub. Pulmonary/Chest: Healed CABG scar, no stridor. No respiratory distress. has no wheezes. has no rales. Abdominal: Soft. Bowel sounds are normal. exhibits no distension. No tenderness. No rebound and no guarding. Musculoskeletal:Normal strength. exhibits no tenderness. Neurological:alert and oriented to person, place, and time. Skin: Skin is warm and dry.   Psychiatric: Memory, affect and judgment normal    Recent Results (from the past 24 hour(s))   EKG, 12 LEAD, INITIAL    Collection Time: 10/15/20  2:28 AM   Result Value Ref Range    Ventricular Rate 93 BPM    Atrial Rate 93 BPM    P-R Interval 156 ms    QRS Duration 104 ms    Q-T Interval 350 ms    QTC Calculation (Bezet) 435 ms    Calculated P Axis 83 degrees    Calculated R Axis -132 degrees    Calculated T Axis -171 degrees    Diagnosis       Sinus rhythm with premature atrial complexes  Right superior axis deviation  Pulmonary disease pattern  ST & T wave abnormality, consider inferior ischemia  Abnormal ECG  No previous ECGs available     CBC WITH AUTOMATED DIFF    Collection Time: 10/15/20  2:30 AM   Result Value Ref Range    WBC 7.6 4.6 - 13.2 K/uL    RBC 2.25 (L) 4.70 - 5.50 M/uL    HGB 6.9 (L) 13.0 - 16.0 g/dL    HCT 22.0 (L) 36.0 - 48.0 %    MCV 97.8 (H) 74.0 - 97.0 FL    MCH 30.7 24.0 - 34.0 PG    MCHC 31.4 31.0 - 37.0 g/dL    RDW 20.8 (H) 11.6 - 14.5 %    PLATELET 541 308 - 192 K/uL    MPV 9.7 9.2 - 11.8 FL    NEUTROPHILS 80 (H) 40 - 73 %    LYMPHOCYTES 17 (L) 21 - 52 %    MONOCYTES 3 3 - 10 %    EOSINOPHILS 0 0 - 5 %    BASOPHILS 0 0 - 2 % ABS. NEUTROPHILS 6.1 1.8 - 8.0 K/UL    ABS. LYMPHOCYTES 1.3 0.9 - 3.6 K/UL    ABS. MONOCYTES 0.2 0.05 - 1.2 K/UL    ABS. EOSINOPHILS 0.0 0.0 - 0.4 K/UL    ABS. BASOPHILS 0.0 0.0 - 0.1 K/UL    DF AUTOMATED      PLATELET COMMENTS ADEQUATE PLATELETS      RBC COMMENTS ANISOCYTOSIS  2+        RBC COMMENTS POLYCHROMASIA  1+        RBC COMMENTS POIKILOCYTOSIS  1+        RBC COMMENTS SCHISTOCYTES  1+        RBC COMMENTS HAILEY CELLS  1+       METABOLIC PANEL, COMPREHENSIVE    Collection Time: 10/15/20  2:30 AM   Result Value Ref Range    Sodium 134 (L) 136 - 145 mmol/L    Potassium 4.0 3.5 - 5.5 mmol/L    Chloride 96 (L) 100 - 111 mmol/L    CO2 24 21 - 32 mmol/L    Anion gap 14 3.0 - 18 mmol/L    Glucose 329 (H) 74 - 99 mg/dL    BUN 66 (H) 7.0 - 18 MG/DL    Creatinine 10.40 (H) 0.6 - 1.3 MG/DL    BUN/Creatinine ratio 6 (L) 12 - 20      GFR est AA 6 (L) >60 ml/min/1.73m2    GFR est non-AA 5 (L) >60 ml/min/1.73m2    Calcium 8.6 8.5 - 10.1 MG/DL    Bilirubin, total 0.7 0.2 - 1.0 MG/DL    ALT (SGPT) 9 (L) 16 - 61 U/L    AST (SGOT) 36 10 - 38 U/L    Alk.  phosphatase 53 45 - 117 U/L    Protein, total 6.1 (L) 6.4 - 8.2 g/dL    Albumin 3.0 (L) 3.4 - 5.0 g/dL    Globulin 3.1 2.0 - 4.0 g/dL    A-G Ratio 1.0 0.8 - 1.7     LIPASE    Collection Time: 10/15/20  2:30 AM   Result Value Ref Range    Lipase 404 (H) 73 - 393 U/L   PROTHROMBIN TIME + INR    Collection Time: 10/15/20  2:30 AM   Result Value Ref Range    Prothrombin time 14.9 11.5 - 15.2 sec    INR 1.2 0.8 - 1.2     PTT    Collection Time: 10/15/20  2:30 AM   Result Value Ref Range    aPTT 25.1 23.0 - 36.4 SEC   TYPE & SCREEN    Collection Time: 10/15/20  2:30 AM   Result Value Ref Range    Crossmatch Expiration 10/18/2020     ABO/Rh(D) A NEGATIVE     Antibody screen NEG     Unit number [id number]     Blood component type [facility]     Unit division 00     Status of unit ISSUED     Crossmatch result Compatible     Unit number [id number]     Blood component type  LR,1     Unit division 00     Status of unit ALLOCATED     Crossmatch result Compatible    OCCULT BLOOD, STOOL    Collection Time: 10/15/20  2:35 AM   Result Value Ref Range    Occult blood, stool Positive (A) NEG     RBC, ALLOCATE    Collection Time: 10/15/20  3:15 AM   Result Value Ref Range    HISTORY CHECKED?  Historical check performed          Diagnostic Studies:  CXR:   Pending

## 2020-10-15 NOTE — ANESTHESIA POSTPROCEDURE EVALUATION
Procedure(s):  ENDOSCOPY w/duodenal ulcer coagulation and epinephrine injection. MAC    Anesthesia Post Evaluation      Multimodal analgesia: multimodal analgesia used between 6 hours prior to anesthesia start to PACU discharge  Patient location during evaluation: bedside  Patient participation: complete - patient cannot participate  Level of consciousness: awake  Pain score: 1  Pain management: adequate  Airway patency: patent  Anesthetic complications: no  Cardiovascular status: acceptable  Respiratory status: acceptable  Hydration status: acceptable  Post anesthesia nausea and vomiting:  none      INITIAL Post-op Vital signs: No vitals data found for the desired time range.

## 2020-10-15 NOTE — ANESTHESIA PREPROCEDURE EVALUATION
Relevant Problems   No relevant active problems       Anesthetic History   No history of anesthetic complications            Review of Systems / Medical History  Patient summary reviewed and pertinent labs reviewed    Pulmonary  Within defined limits                 Neuro/Psych   Within defined limits           Cardiovascular    Hypertension          Past MI and CAD         GI/Hepatic/Renal         Renal disease: ESRD and dialysis       Endo/Other    Diabetes         Other Findings              Physical Exam    Airway  Mallampati: II  TM Distance: 4 - 6 cm  Neck ROM: normal range of motion   Mouth opening: Normal     Cardiovascular  Regular rate and rhythm,  S1 and S2 normal,  no murmur, click, rub, or gallop  Rhythm: regular  Rate: normal         Dental  No notable dental hx       Pulmonary  Breath sounds clear to auscultation               Abdominal  GI exam deferred       Other Findings            Anesthetic Plan    ASA: 4, emergent  Anesthesia type: MAC          Induction: Intravenous  Anesthetic plan and risks discussed with: Patient

## 2020-10-15 NOTE — CONSULTS
[email or web address]  [phone]    GASTROENTEROLOGY CONSULT      Impression:   1. GI bleed - melena for past week, STEMI with stent placement 2 weeks ago at [facility], placed on Plavix and aspirin. 3. Acute Anemia - likely due to #1, hgb 7.8 after transfusion  4. S/p STEMI with LIMA to LAD stent 2 weeks ago at [facility]  5. CAD - hx CABG 1996  6. CHF, cardiomyopathy - EF 15-25% 2 weeks ago  7. ESRD on HD, recently started  8. COPD  9. DM      Plan:     1. EGD today  2. IV PPI  3. Monitor h/h, transfuse per protocol  4. Hold anticoagulation  5. Keep NPO  6. Medical management per primary team      Chief Complaint: Upper GI bleed, acute anemia      HPI:  Zoya Alexandra is a 65 y.o. male who I am being asked to see in consultation for an opinion regarding the above. He has multiple medical problems including CAD, CABG in 1996, COPD, cardiomyopathy with EF of 15-25%, diabetes mellitus who was discharged from [facility] a week ago after 2 weeks hospitalization for STEMI requiring stenting of the LIMA to LAD graft. During hospitalization he was also noted anemic requiring blood transfusion and developed renal failure requiring dialysis and was discharged on Monday Wednesday and Friday schedule. His EF was also found to have dropped to 15% and evaluation for AICD defect outpatient follow-up and repeat echo. He was discharged from [facility] on 10/8. He presented to the ED early this morning with severe weakness, dizziness and a fall at home, no LOC. He reports dark stools for past week. He denies abdominal pain. Labs show initial hgb of 6.9, improved to 7.8 after transfusion. He denies previous scopes. He recently started dialysis for ESRD, missed this yesterday. PMH:   History reviewed. No pertinent past medical history. PSH:   History reviewed. No pertinent surgical history.     Social HX:   Social History     Socioeconomic History    Marital status: SINGLE     Spouse name: Not on file    Number of children: Not on file    Years of education: Not on file    Highest education level: Not on file   Occupational History    Not on file   Social Needs    Financial resource strain: Not on file    Food insecurity     Worry: Not on file     Inability: Not on file    Transportation needs     Medical: Not on file     Non-medical: Not on file   Tobacco Use    Smoking status: Not on file   Substance and Sexual Activity    Alcohol use: Not on file    Drug use: Not on file    Sexual activity: Not on file   Lifestyle    Physical activity     Days per week: Not on file     Minutes per session: Not on file    Stress: Not on file   Relationships    Social connections     Talks on phone: Not on file     Gets together: Not on file     Attends Taoism service: Not on file     Active member of club or organization: Not on file     Attends meetings of clubs or organizations: Not on file     Relationship status: Not on file    Intimate partner violence     Fear of current or ex partner: Not on file     Emotionally abused: Not on file     Physically abused: Not on file     Forced sexual activity: Not on file   Other Topics Concern    Not on file   Social History Narrative    Not on file       FHX:   History reviewed. No pertinent family history. Allergy:   Allergies   Allergen Reactions    Penicillins Hives and Itching       Patient Active Problem List   Diagnosis Code    Dizziness R42    Anemia D64.9    GIB (gastrointestinal bleeding) K92.2       Home Medications:     (Not in a hospital admission)      Review of Systems:     Constitutional: fatigue. Skin: No rashes, pruritis, jaundice, ulcerations, erythema. HENT: No headaches, nosebleeds, sinus pressure, rhinorrhea, sore throat. Eyes: No visual changes, blurred vision, eye pain, photophobia, jaundice. Cardiovascular: No chest pain, heart palpitations. Respiratory: No cough, SOB, wheezing, chest discomfort, orthopnea.    Gastrointestinal: Melena Genitourinary: No dysuria, bleeding, discharge, pyuria. Musculoskeletal: No weakness, arthralgias, wasting. Endo: No sweats. Heme: No bruising, easy bleeding. Allergies: As noted. Neurological: Cranial nerves intact. Alert and oriented. Gait not assessed. Psychiatric:  No anxiety, depression, hallucinations. Visit Vitals  BP (!) 101/55   Pulse (!) 117   Temp 97 °F (36.1 °C)   Resp (!) 35   SpO2 99%       Physical Assessment:     constitutional: appearance: Chronically ill appearing, in no acute distress. skin: inspection: no rashes, ulcers, icterus or other lesions; no clubbing or telangiectasias. palpation: no induration or subcutaneos nodules. eyes: inspection: normal conjunctivae and lids; no jaundice pupils: normal  ENMT: mouth: normal oral mucosa,lips and gums; poor dentition. oropharynx: normal tongue, hard and soft palate; posterior pharynx without erithema, exudate or lesions. neck: thyroid: normal size, consistency and position; no masses or tenderness. respiratory: effort: normal chest excursion; no intercostal retraction or accessory muscle use. cardiovascular: abdominal aorta: normal size and position; no bruits. palpation: PMI of normal size and position; normal rhythm; no thrill or murmurs. abdominal: abdomen: normal consistency; no tenderness or masses. hernias: no hernias appreciated. liver: normal size and consistency. spleen: not palpable. rectal: hemoccult/guaiac: not performed. musculoskeletal: grossly normal, gait not assessed, resting on stretcher   neurologic: cranial nerves: II-XII grossly normal. Pupils intact. psychiatric: judgement/insight: within normal limits. memory: within normal limits for recent and remote events. mood and affect: no evidence of depression, anxiety or agitation. orientation: oriented to time, space and person.         Basic Metabolic Profile   Recent Labs     10/15/20  0230   *   K 4.0   CL 96*   CO2 24   BUN 66*   GLU 329*   CA 8.6         CBC w/Diff    Recent Labs     10/15/20  0600 10/15/20  0230   WBC  --  7.6   RBC  --  2.25*   HGB 7.8* 6.9*   HCT 24.6* 22.0*   MCV  --  97.8*   MCH  --  30.7   MCHC  --  31.4   RDW  --  20.8*   PLT  --  141    Recent Labs     10/15/20  0230   GRANS 80*   LYMPH 17*   EOS 0        Hepatic Function   Recent Labs     10/15/20  0230   ALB 3.0*   TP 6.1*   TBILI 0.7   AP 53   LPSE 404*        Coags   Recent Labs     10/15/20  0230   PTP 14.9   INR 1.2   APTT 25.1           MANDI Holcomb. [facility] [place] 1949, [address] [facility]  Cell: [phone]  [email or web address]

## 2020-10-15 NOTE — ED TRIAGE NOTES
Patient arrived to ED via EMS after he felt dizzy and fell. Patient denies syncope and any traume from the fall. Per patient he missed his dialysis treatment yesterday. Patient has a history of ESRD and DM. Patient had a MI 2 weeks ago and one stent was placed.

## 2020-10-15 NOTE — ED NOTES
Patient A&O x4 sitting in ED bed watching TV with no signs of distress. Patient currently has no complaints of discomfort.

## 2020-10-15 NOTE — ED NOTES
The patient is tolerating the blood transfusion well. The patient currently has no complaints of discomfort.

## 2020-10-16 LAB
ALBUMIN SERPL-MCNC: 2.8 G/DL (ref 3.4–5)
ALBUMIN/GLOB SERPL: 1.2 {RATIO} (ref 0.8–1.7)
ALP SERPL-CCNC: 42 U/L (ref 45–117)
ALT SERPL-CCNC: 8 U/L (ref 16–61)
ANION GAP SERPL CALC-SCNC: 13 MMOL/L (ref 3–18)
AST SERPL-CCNC: 26 U/L (ref 10–38)
BILIRUB SERPL-MCNC: 1 MG/DL (ref 0.2–1)
BUN SERPL-MCNC: 82 MG/DL (ref 7–18)
BUN/CREAT SERPL: 7 (ref 12–20)
CALCIUM SERPL-MCNC: 8.4 MG/DL (ref 8.5–10.1)
CHLORIDE SERPL-SCNC: 100 MMOL/L (ref 100–111)
CO2 SERPL-SCNC: 25 MMOL/L (ref 21–32)
CREAT SERPL-MCNC: 11.7 MG/DL (ref 0.6–1.3)
ERYTHROCYTE [DISTWIDTH] IN BLOOD BY AUTOMATED COUNT: 21.1 % (ref 11.6–14.5)
GLOBULIN SER CALC-MCNC: 2.3 G/DL (ref 2–4)
GLUCOSE BLD STRIP.AUTO-MCNC: 152 MG/DL (ref 70–110)
GLUCOSE BLD STRIP.AUTO-MCNC: 157 MG/DL (ref 70–110)
GLUCOSE BLD STRIP.AUTO-MCNC: 168 MG/DL (ref 70–110)
GLUCOSE BLD STRIP.AUTO-MCNC: 177 MG/DL (ref 70–110)
GLUCOSE SERPL-MCNC: 145 MG/DL (ref 74–99)
HCT VFR BLD AUTO: 21 % (ref 36–48)
HCT VFR BLD AUTO: 21.9 % (ref 36–48)
HCT VFR BLD AUTO: 22.6 % (ref 36–48)
HGB BLD-MCNC: 6.9 G/DL (ref 13–16)
HGB BLD-MCNC: 7.2 G/DL (ref 13–16)
HGB BLD-MCNC: 7.4 G/DL (ref 13–16)
MCH RBC QN AUTO: 30.8 PG (ref 24–34)
MCHC RBC AUTO-ENTMCNC: 32.9 G/DL (ref 31–37)
MCV RBC AUTO: 93.6 FL (ref 74–97)
PLATELET # BLD AUTO: 150 K/UL (ref 135–420)
PMV BLD AUTO: 9.3 FL (ref 9.2–11.8)
POTASSIUM SERPL-SCNC: 3.5 MMOL/L (ref 3.5–5.5)
PROT SERPL-MCNC: 5.1 G/DL (ref 6.4–8.2)
RBC # BLD AUTO: 2.34 M/UL (ref 4.7–5.5)
SODIUM SERPL-SCNC: 138 MMOL/L (ref 136–145)
WBC # BLD AUTO: 12.5 K/UL (ref 4.6–13.2)

## 2020-10-16 PROCEDURE — 82962 GLUCOSE BLOOD TEST: CPT

## 2020-10-16 PROCEDURE — 74011000250 HC RX REV CODE- 250: Performed by: INTERNAL MEDICINE

## 2020-10-16 PROCEDURE — 85027 COMPLETE CBC AUTOMATED: CPT

## 2020-10-16 PROCEDURE — 5A1D70Z PERFORMANCE OF URINARY FILTRATION, INTERMITTENT, LESS THAN 6 HOURS PER DAY: ICD-10-PCS | Performed by: INTERNAL MEDICINE

## 2020-10-16 PROCEDURE — 94640 AIRWAY INHALATION TREATMENT: CPT

## 2020-10-16 PROCEDURE — 74011250636 HC RX REV CODE- 250/636: Performed by: INTERNAL MEDICINE

## 2020-10-16 PROCEDURE — 74011000250 HC RX REV CODE- 250: Performed by: EMERGENCY MEDICINE

## 2020-10-16 PROCEDURE — 74011250637 HC RX REV CODE- 250/637: Performed by: INTERNAL MEDICINE

## 2020-10-16 PROCEDURE — 90935 HEMODIALYSIS ONE EVALUATION: CPT

## 2020-10-16 PROCEDURE — 85018 HEMOGLOBIN: CPT

## 2020-10-16 PROCEDURE — 65660000004 HC RM CVT STEPDOWN

## 2020-10-16 PROCEDURE — C9113 INJ PANTOPRAZOLE SODIUM, VIA: HCPCS | Performed by: INTERNAL MEDICINE

## 2020-10-16 PROCEDURE — 36415 COLL VENOUS BLD VENIPUNCTURE: CPT

## 2020-10-16 PROCEDURE — 74011636637 HC RX REV CODE- 636/637: Performed by: EMERGENCY MEDICINE

## 2020-10-16 PROCEDURE — 99232 SBSQ HOSP IP/OBS MODERATE 35: CPT | Performed by: EMERGENCY MEDICINE

## 2020-10-16 PROCEDURE — 76450000000

## 2020-10-16 PROCEDURE — 80053 COMPREHEN METABOLIC PANEL: CPT

## 2020-10-16 PROCEDURE — 94762 N-INVAS EAR/PLS OXIMTRY CONT: CPT

## 2020-10-16 PROCEDURE — 2709999900 HC NON-CHARGEABLE SUPPLY

## 2020-10-16 RX ORDER — HEPARIN SODIUM 1000 [USP'U]/ML
5000 INJECTION, SOLUTION INTRAVENOUS; SUBCUTANEOUS
Status: DISCONTINUED | OUTPATIENT
Start: 2020-10-16 | End: 2020-10-20 | Stop reason: HOSPADM

## 2020-10-16 RX ORDER — METOPROLOL TARTRATE 5 MG/5ML
1.25 INJECTION INTRAVENOUS EVERY 6 HOURS
Status: DISCONTINUED | OUTPATIENT
Start: 2020-10-16 | End: 2020-10-17

## 2020-10-16 RX ADMIN — SUCRALFATE 1 G: 1 TABLET ORAL at 09:03

## 2020-10-16 RX ADMIN — METOPROLOL TARTRATE 1.25 MG: 5 INJECTION INTRAVENOUS at 18:10

## 2020-10-16 RX ADMIN — HEPARIN SODIUM 5000 UNITS: 1000 INJECTION INTRAVENOUS; SUBCUTANEOUS at 14:15

## 2020-10-16 RX ADMIN — SODIUM CHLORIDE 10 ML: 9 INJECTION, SOLUTION INTRAMUSCULAR; INTRAVENOUS; SUBCUTANEOUS at 21:56

## 2020-10-16 RX ADMIN — SODIUM CHLORIDE 40 MG: 9 INJECTION INTRAMUSCULAR; INTRAVENOUS; SUBCUTANEOUS at 21:48

## 2020-10-16 RX ADMIN — SODIUM CHLORIDE 40 MG: 9 INJECTION INTRAMUSCULAR; INTRAVENOUS; SUBCUTANEOUS at 09:02

## 2020-10-16 RX ADMIN — BUDESONIDE 500 MCG: 0.5 INHALANT RESPIRATORY (INHALATION) at 19:59

## 2020-10-16 RX ADMIN — INSULIN GLARGINE 5 UNITS: 100 INJECTION, SOLUTION SUBCUTANEOUS at 21:47

## 2020-10-16 RX ADMIN — ATORVASTATIN CALCIUM 80 MG: 40 TABLET, FILM COATED ORAL at 21:48

## 2020-10-16 RX ADMIN — SUCRALFATE 1 G: 1 TABLET ORAL at 03:40

## 2020-10-16 NOTE — PROGRESS NOTES
[email or web address]  [phone]    Gastroenterology follow up-Progress note    Impression:  1. GI bleed s/p EGD 10/15/2020 - duodenal ulcer with adherent clot oozing BRB, epi injection, Bicap applied, bleeding ceased. H pylori pending. 2. Acute anemia - 7.4 today, improving  3. S/p STEMI with LIMA to LAD stent 2 weeks ago at [facility]  4. CAD - hx CABG 1996  5. CHF, cardiomyopathy - EF 15-25% 2 weeks ago  6. ESRD on HD, recently started - needs to continue dailysis  7. COPD  8. DM    Plan:  1. Continue IV PPI and carafate  2. Monitor h/h, transfuse per protocol, monitor for recurrent bleeding  3. Hold anticoagulation  4. Await H. Pylori, if positive will treat  5. Continue ice chips today then clears as tolerated  6. Continue dialysis per nephrology  7. Medical management per primary team    Chief Complaint: Upper GI bleed, acute anemia      Subjective:  Feeling fatigued, several episodes of diarrhea, bloody per nursing staff, no drop in h/h. - likely residual from bleeding ulcer. ROS: Denies any fevers, chills, rash.      Eyes: conjunctiva normal, EOM normal   Neck: ROM normal, supple and trachea normal   Cardiovascular: heart normal, intact distal pulses, normal rate and regular rhythm   Pulmonary/Chest Wall: breath sounds normal and effort normal   Abdominal: appearance normal, bowel sounds normal and soft, non-acute, non-tender     Patient Active Problem List   Diagnosis Code    Dizziness R42    Anemia D64.9    GIB (gastrointestinal bleeding) K92.2         Visit Vitals  /60 (BP 1 Location: Left arm, BP Patient Position: At rest;Head of bed elevated (Comment degrees))   Pulse (!) 104   Temp 98 °F (36.7 °C)   Resp 18   Ht 5' 9\" (1.753 m)   Wt 88.1 kg (194 lb 3.2 oz)   SpO2 98%   BMI 28.68 kg/m²           Intake/Output Summary (Last 24 hours) at 10/16/2020 1000  Last data filed at 10/16/2020 0856  Gross per 24 hour   Intake 366.25 ml   Output    Net 366.25 ml       CBC w/Diff    Lab Results   Component Value Date/Time    WBC 12.5 10/16/2020 05:08 AM    RBC 2.34 (L) 10/16/2020 05:08 AM    HGB 7.4 (L) 10/16/2020 08:28 AM    HCT 22.6 (L) 10/16/2020 08:28 AM    MCV 93.6 10/16/2020 05:08 AM    MCH 30.8 10/16/2020 05:08 AM    MCHC 32.9 10/16/2020 05:08 AM    RDW 21.1 (H) 10/16/2020 05:08 AM     10/16/2020 05:08 AM    Lab Results   Component Value Date/Time    GRANS 80 (H) 10/15/2020 02:30 AM    LYMPH 17 (L) 10/15/2020 02:30 AM    EOS 0 10/15/2020 02:30 AM    BASOS 0 10/15/2020 02:30 AM      Basic Metabolic Profile   Recent Labs     10/16/20  0508      K 3.5      CO2 25   BUN 82*   CA 8.4*        Hepatic Function    Lab Results   Component Value Date/Time    ALB 2.8 (L) 10/16/2020 05:08 AM    TP 5.1 (L) 10/16/2020 05:08 AM    AP 42 (L) 10/16/2020 05:08 AM    No results found for: TBIL       Coags   Recent Labs     10/15/20  0230   PTP 14.9   INR 1.2   APTT 25.1               MANDI Holcomb    [facility]. [email or web address]  Phone: [phone]  Pager: [phone]

## 2020-10-16 NOTE — PROGRESS NOTES
[facility] Hospitalist Group  Progress Note    Patient: Maren Geller Age: 65 y.o. : 1950 MR#: [id number] SSN: [id number]  Date/Time: 10/16/2020    Subjective:     I saw patient during hemodialysis. Patient is laying in bed in no apparent distress, awake and alert. Denies any chest pain or shortness of breath. Denies any abdominal pain.     Assessment/Plan:     Acute blood loss anemia  GI bleed  Duodenal ulcer with active bleeding noted on EGD  Coronary artery disease, recent STEMI requiring PCI and stent  Ischemic cardiomyopathy EF of 25%  End-stage renal disease   chronic systolic congestive heart failure  Hypertension   COPD without acute exacerbation    Plan  Status post EGD, n.p.o. as per GI  Continue PPI, monitor hemoglobin and hematocrit  Restart aspirin and Plavix when okay with GI  Monitor labs  Hemodialysis as per nephrology monitor blood pressure  Will start low-dose IV Lopressor given tachycardia, with hold parameters  PT OT  Discussed with patient  I offered to call patient's family but patient declined and stated that he is keeping them informed    Case discussed with:  [x]Patient  []Family  []Nursing  []Case Management  DVT Prophylaxis:  []Lovenox  []Hep SQ  [x]SCDs  []Coumadin   []On Heparin gtt    Objective:   VS:   Visit Vitals  /70 (BP 1 Location: Left arm, BP Patient Position: At rest;Head of bed elevated (Comment degrees))   Pulse (!) 117   Temp 98.7 °F (37.1 °C)   Resp 20   Ht 5' 9\" (1.753 m)   Wt 88.1 kg (194 lb 3.2 oz)   SpO2 99%   BMI 28.68 kg/m²      Tmax/24hrs: Temp (24hrs), Av.4 °F (36.9 °C), Min:98 °F (36.7 °C), Max:98.7 °F (37.1 °C)    Input/Output:     Intake/Output Summary (Last 24 hours) at 10/16/2020 1646  Last data filed at 10/16/2020 1414  Gross per 24 hour   Intake 166.25 ml   Output 0 ml   Net 166.25 ml       General:  Awake, follows commands  Cardiovascular:  S1S2+, RRR  Pulmonary:  CTA b/l  GI:  Soft, BS+, NT, ND  Extremities: trace edema      Labs:    Recent Results (from the past 24 hour(s))   RBC, ALLOCATE    Collection Time: 10/15/20  5:45 PM   Result Value Ref Range    HISTORY CHECKED? Historical check performed    GLUCOSE, POC    Collection Time: 10/15/20  9:54 PM   Result Value Ref Range    Glucose (POC) 154 (H) 70 - 110 mg/dL   HGB & HCT    Collection Time: 10/15/20 11:40 PM   Result Value Ref Range    HGB 7.5 (L) 13.0 - 16.0 g/dL    HCT 23.1 (L) 36.0 - 48.0 %   GLUCOSE, POC    Collection Time: 10/16/20  1:13 AM   Result Value Ref Range    Glucose (POC) 157 (H) 70 - 254 mg/dL   METABOLIC PANEL, COMPREHENSIVE    Collection Time: 10/16/20  5:08 AM   Result Value Ref Range    Sodium 138 136 - 145 mmol/L    Potassium 3.5 3.5 - 5.5 mmol/L    Chloride 100 100 - 111 mmol/L    CO2 25 21 - 32 mmol/L    Anion gap 13 3.0 - 18 mmol/L    Glucose 145 (H) 74 - 99 mg/dL    BUN 82 (H) 7.0 - 18 MG/DL    Creatinine 11.70 (H) 0.6 - 1.3 MG/DL    BUN/Creatinine ratio 7 (L) 12 - 20      GFR est AA 5 (L) >60 ml/min/1.73m2    GFR est non-AA 4 (L) >60 ml/min/1.73m2    Calcium 8.4 (L) 8.5 - 10.1 MG/DL    Bilirubin, total 1.0 0.2 - 1.0 MG/DL    ALT (SGPT) 8 (L) 16 - 61 U/L    AST (SGOT) 26 10 - 38 U/L    Alk.  phosphatase 42 (L) 45 - 117 U/L    Protein, total 5.1 (L) 6.4 - 8.2 g/dL    Albumin 2.8 (L) 3.4 - 5.0 g/dL    Globulin 2.3 2.0 - 4.0 g/dL    A-G Ratio 1.2 0.8 - 1.7     CBC W/O DIFF    Collection Time: 10/16/20  5:08 AM   Result Value Ref Range    WBC 12.5 4.6 - 13.2 K/uL    RBC 2.34 (L) 4.70 - 5.50 M/uL    HGB 7.2 (L) 13.0 - 16.0 g/dL    HCT 21.9 (L) 36.0 - 48.0 %    MCV 93.6 74.0 - 97.0 FL    MCH 30.8 24.0 - 34.0 PG    MCHC 32.9 31.0 - 37.0 g/dL    RDW 21.1 (H) 11.6 - 14.5 %    PLATELET 947 756 - 057 K/uL    MPV 9.3 9.2 - 11.8 FL   GLUCOSE, POC    Collection Time: 10/16/20  6:52 AM   Result Value Ref Range    Glucose (POC) 168 (H) 70 - 110 mg/dL   GLUCOSE, POC    Collection Time: 10/16/20  7:23 AM   Result Value Ref Range    Glucose (POC) 177 (H) 70 - 110 mg/dL   HGB & HCT    Collection Time: 10/16/20  8:28 AM   Result Value Ref Range    HGB 7.4 (L) 13.0 - 16.0 g/dL    HCT 22.6 (L) 36.0 - 48.0 %   GLUCOSE, POC    Collection Time: 10/16/20  4:06 PM   Result Value Ref Range    Glucose (POC) 152 (H) 70 - 110 mg/dL     Additional Data Reviewed:      Signed By: Zabrina Long MD     October 16, 2020

## 2020-10-16 NOTE — PROGRESS NOTES
Palliative Medicine   Consult received. Chart review in progress. Thank you for the Palliative Medicine consult and allowing us to participate in the care of Ms. Ilya Scruggs. Will continue to monitor and provide support.   Jeffrey GARCIA, RN, [facility]  Palliative Medicine Inpatient   DR. [facility]   Palliative COPE Line: [phone] ([phone])

## 2020-10-16 NOTE — DIALYSIS
JACK        ACUTE HEMODIALYSIS FLOW SHEET      HEMODIALYSIS ORDERS: Physician: Miko     Dialyzer: revaclear   Duration: 3 hr  BFR: 400   DFR: 800   Dialysate:  Temp 36-37*C  K+   3    Ca+  2.5 Na 140 Bicarb 35   Weight:  88.1 kg    Patient Chart [x]     Unable to Obtain []   Dry weight/UF Goal: 1500   Access: right chest TDC    Heparin []  Bolus      Units    [] Hourly       Units    [x]None      Catheter locking solution: heparin    Pre BP:   134/68    Pulse:     106       Respirations: 18  Temperature:   98.5   Labs: Pre        Post:         [x] N/A   Additional Orders(medications, blood products, hypotension management):       [x] N/A     [x] Jack Consent Verified     CATHETER ACCESS: []N/A   [x]Right chest  []Left   []IJ     []Fem   []transhepatic   [] First use X-ray verified     [x]Permanent                [] Temporary   [x]No S/S infection  []Redness  []Drainage []Cultured []Swelling []Pain   [x]Medical Aseptic Prep Utilized   []Dressing Changed  [x] Biopatch  Date:  10/12/2020   []Clotted   [x]Patent   Flows: [x]Good  []Poor  []Reversed   If access problem,  notified: []Yes    [x]N/A        GRAFT/FISTULA ACCESS:  [x]N/A                               GENERAL ASSESSMENT:      LUNGS:  Rate 18 SaO2%        [] N/A    [x] Clear  [] Coarse  [] Crackles  [] Wheezing        [] Diminished     Location : []RLL   []LLL    []RUL  []KATHY     Cough: []Productive  []Dry  [x]N/A   Respirations:  [x]Easy  []Labored     Therapy:   [x]RA  []NC  l/min    Mask: []NRB []Venti       O2%                  []Ventilator  []Intubated  [] Trach  [] BiPaP     CARDIAC: [x]Regular      [] Irregular   [] Pericardial Rub  [] JVD        []  Monitored  [] Bedside  [] Remotely monitored [x] N/A     EDEMA: [x] None   []Generalized  [] Pitting [] 1    [] 2    [] 3    [] 4                 [] Facial  [] Pedal  []  UE  [] LE     SKIN:   [x] Warm   [] Hot     [] Cold   [x] Dry     [] Pale   [] Diaphoretic                  [] Flushed  [] Jaundiced  [] Cyanotic  [] Rash  [] Weeping     LOC:    [x] Alert      [x]Oriented:    [x] Person     [x] Place  [x]Time               [] Confused  [] Lethargic  [] Medicated  [] Non-responsive     GI / ABDOMEN:  [] Flat    [] Distended    [x] Soft    [] Firm   []  Obese                             [] Diarrhea  [x] Bowel Sounds  [] Nausea  [] Vomiting       / URINE ASSESSMENT:[] Voiding   [x] Oliguria  [] Anuria   []  Carter     [] Incontinent    []  Incontinent Brief      []  Bathroom Privileges       PAIN: [x] 0 []1  []2   []3   []4   []5   []6   []7   []8   []9   []10              Scale 0-10  Action/Follow Up:      MOBILITY:  [] Amb    [] Amb/Assist    [x] Bed    [] Wheelchair  [] Stretcher      All Vitals and Treatment Details on Attached [address]: [facility]          Room # 2304/01      [] 1st Time Acute  [] Stat  [x] Routine  [] Urgent     [x] Acute Room  []  Bedside  [] ICU/CCU  [] ER   Isolation Precautions:   There are currently no Active Isolations      Special Considerations:         [] Blood Consent Verified [x]N/A      ALLERGIES:   Allergies   Allergen Reactions    Penicillins Hives and Itching               Code Status:Full Code        Hepatitis Status:    2nd RN check: TLO                    Care everywhere: Negative/Immune 09/30/2020                 Current Labs:   Lab Results   Component Value Date/Time    Sodium 138 10/16/2020 05:08 AM    Potassium 3.5 10/16/2020 05:08 AM    Chloride 100 10/16/2020 05:08 AM    CO2 25 10/16/2020 05:08 AM    Anion gap 13 10/16/2020 05:08 AM    Glucose 145 (H) 10/16/2020 05:08 AM    BUN 82 (H) 10/16/2020 05:08 AM    Creatinine 11.70 (H) 10/16/2020 05:08 AM    BUN/Creatinine ratio 7 (L) 10/16/2020 05:08 AM    GFR est AA 5 (L) 10/16/2020 05:08 AM    GFR est non-AA 4 (L) 10/16/2020 05:08 AM    Calcium 8.4 (L) 10/16/2020 05:08 AM      Lab Results   Component Value Date/Time    WBC 12.5 10/16/2020 05:08 AM    HGB 7.4 (L) 10/16/2020 08:28 AM    HCT 22.6 (L) 10/16/2020 08:28 AM    PLATELET 378 09/66/7237 05:08 AM    MCV 93.6 10/16/2020 05:08 AM                                                                                     DIET:   DIET NPO       PRIMARY NURSE REPORT: First initial/Last name/Title      Pre Dialysis: Samson Pineda RN     Time: 3173      EDUCATION:    [x] Patient [] Other         Knowledge Basis: []None [x]Minimal [] Substantial   Barriers to learning  [x]N/A   [] Access Care     [] S&S of infection     [] Fluid Management     []K+     [x]Procedural    []Albumin     [] Medications     [] Tx Options     [] Transplant     [] Diet     [] Other   Teaching Tools:  [x] Explain  [x] Demo  [] Handouts [] Video  Patient response:  [x] Verbalized understanding  [] Teach back  [] Return demonstration [] Requires follow up   Inappropriate due to:            [x]Time Out/Safety Check  [x]Extracorporeal Circuit Tested for integrity       RO/HEMODIALYSIS MACHINE SAFETY CHECKS  Before each treatment:     Machine Number:                   [facility]                                  [x] Unit Machine # 6 with centralized RO                        Alarm Test:  Pass time 1000               [x] RO/Machine Log Complete      Temp   36             Dialysate: pH  7.4 Conductivity: Meter   13.8     HD Machine   13.9                  TCD: 14.0  Dialyzer Lot # [id number]            Blood Tubing Lot # [id number]          Saline Lot #  [id number]     CHLORINE TESTING-Before each treatment and every 4 hours    Total Chlorine: [x] less than 0.1 ppm  Time: 0900 4 Hr/2nd Check Time: 1300   (if greater than 0.1 ppm from Primary then every 30 minutes from Secondary)     TREATMENT INITIATION  with Dialysis Precautions:   [x] All Connections Secured                 [x] Saline Line Double Clamped   [x] Venous Parameters Set                  [x] Arterial Parameters Set    [x] Prime Given 250ml                          [x]Air Foam Detector Engaged      Treatment Initiation Note:    Pt arrived to HD unit via bed in stable condition. A&Ox4 in no distress on RA. Right chest TDC assessed with no s/s complications. HD initiated without difficulty. During Treatment Notes:  1100 Pt awake and alert. Face and access in view with connections secure. 1130 BP dropping. MD notified. Order received to not pull fluid. Pt in no noted distress. Face and access in view with connections secure. 1200 Pt awake and alert. Face and access in view with connections secure. 1230 Pt awake and alert. Face and access in view with connections secure. 1300 Pt resting with eyes closed. Face and access in view with connections secure. 1330 Pt awake and alert. Face and access in view with connections secure. 1400 Pt awake and alert. Face and access in view with connections secure. Medication Dose Volume Route Time [facility] name Title   none     COURTNEY Arnold RN                   Post Assessment:   Dialyzer Cleared: [] Good [x] Fair  [] Poor  Blood processed:  69.5 L  UF Removed  500 Ml  POst BP:   132/69       Pulse: 107        Respirations: 18  Temperature: 98.4 Lungs:     [x] Clear      [] Course         [] Crackles    [] Wheezing         [] Diminished   Post Tx Vascular Access:     N/A Cardiac:   [x] Regular   [] Irregular   [] Monitor  [x] N/A         Catheter:   Locking solution: Heparin 1:1000   Art. 1.6  Teja. 1.6     Skin:   Pain:   [x] Warm  [x] Dry [] Diaphoretic    [] Flushed    [] Pale [] Cyanotic [x]0  []1  []2   []3  []4   []5   []6   []7   []8   []9   []10     Post Treatment Note:  Pt tolerated treatment well. Net 0 L UF removed.      POST TREATMENT PRIMARY NURSE HANDOFF REPORT:     First initial/Last name/Title         Post Dialysis: Letha Taylor RN Time:  9336     Abbreviations: AVG-arterial venous graft, AVF-arterial venous fistula, IJ-Internal Jugular, Subcl-Subclavian, Fem-Femoral, Tx-treatment, AP/HR-apical heart rate, DFR-dialysate flow rate, BFR-blood flow rate, AP-arterial pressure, -venous pressure, UF-ultrafiltrate, TMP-transmembrane pressure, Teja-Venous, Art-Arterial, RO-Reverse Osmosis

## 2020-10-16 NOTE — PROGRESS NOTES
0715-Bedside and Verbal shift change report received from  [place] ,RN (off going nurse). Report included the following information SBAR, Kardex, MAR and Recent Results. Assumed care,fall prevention program maintained,call bell and bedside table within reach. Will continue care. 1010-Patient went to dialysis by bed.  1600-Patient back from dialysis. Diet of full liquid was given for dinner. Patient said he does not want to over eat he took some bites of Ice cream,some spoons of soup & 3/4 of ginger ale tolerated the diet. 1920-Bedside and Verbal shift change report given to NORMA Sanabria (oncoming nurse) by Judy Pereira RN (offgoing nurse).  Report included the following information SBAR, Kardex, MAR and Recent Results.

## 2020-10-16 NOTE — PROGRESS NOTES
Comprehensive Nutrition Assessment    Type and Reason for Visit: Initial, Positive nutrition screen    Nutrition Recommendations/Plan:   - Monitor readiness for diet initiation and need for oral nutrition supplements. Nutrition Assessment:  S/p EGD yesterday with actively bleeding duodenoal ulcer noted. Pt NPO at this time with carafate. Pt off floor during visit today, unable to speak with pt. Malnutrition Assessment:  Malnutrition Status: At risk for malnutrition (specify)(NPO)      Nutrition History and Allergies: PMHx includes ESRD on HD, DM, MI with stent, CAD with CABG, HTN and COPD. Presented to ED c/o weakness, fall, dizziness and bleeding per rectum. Reported nausea and poor intake to Nephrologist. Per care everywhere wt of 205# in July 2020 (-11#, 5.3% x 3 months). NKFA    Estimated Daily Nutrient Needs:  Energy (kcal):  0323-3459  Protein (g):         Fluid (ml/day):  750-1500    Nutrition Related Findings:  Loose BM 10/15, miralax prn & not receiving. HD today 500 mL UF removed. Wounds:    None       Current Nutrition Therapies:  DIET NPO Except Meds    Anthropometric Measures:  · Height:  5' 9\" (175.3 cm)  · Current Body Wt:  88.1 kg (194 lb 3.6 oz)   · Admission Body Wt:  189 lb(pt stated)    · Usual Body Wt:  200 lb(per chart)     · Ideal Body Wt:  160 lbs:  121.4 %   · BMI Category: Overweight (BMI 25.0-29. 9)       Nutrition Diagnosis:   · Inadequate oral intake related to altered GI function as evidenced by NPO or clear liquid status due to medical condition(admitted with GI bleeding)    · Increased nutrient needs related to renal dysfunction as evidenced by dialysis    Nutrition Interventions:   Food and/or Nutrient Delivery: Continue NPO  Nutrition Education and Counseling: Education not indicated  Coordination of Nutrition Care: Continued inpatient monitoring    Goals:  Nutritional needs will be met through adequate oral intake or nutrition support within the next 7 days. Nutrition Monitoring and Evaluation:   Food/Nutrient Intake Outcomes: Diet advancement/tolerance  Physical Signs/Symptoms Outcomes: Biochemical data, GI status, Fluid status or edema, Hemodynamic status, Nutrition focused physical findings    Discharge Planning:     Too soon to determine     Electronically signed by Linda Dahl RD on 10/16/2020 at 3:12 PM    Contact: [phone]

## 2020-10-16 NOTE — CONSULTS
Consult Note      Consult requested by: Ale Basurto MD    ADMIT DATE: 10/15/2020    Check okay I thought CONSULT DMITRI E: October 16, 2020           Admission diagnosis: GI bleed  Reason for Nephrology Consultation: KSYLER    Assessment and plan:    #1 Dialysis dependent SKYLER Monday Wednesday Friday, etiology contrast nephropathy, missed dialysis on Wednesday. Dialysis today for volume and solute. Patient's creatinine is 11, volume status looks okay. He does seem to be making some urine, need to follow renal parameters closely to assess for recovery. #2 GI bleed due to duodenal ulcer, noted GI consultation  #3 Acute blood loss anemia  #4 Coronary artery disease s/p CABG  #5 Diabetes mellitus    Plan:    #1 hemodialysis today for volume and solute  #2 continue Monday Wednesday Friday dialysis while in hospital  #3 PRBC transfusion as needed, defer to primary  #4 follow GI recommendations  #5 patient has a [facility] chair in [place] where he should follow-up after discharge. He does have Monday Wednesday Friday schedule. #6 JASS with HD   #7 check pth and phos     Next HD planned for Monday if still in hospital  Follow peripherally over the weekend     Please call with questions, chest congestion    Aletha Quinones MD FASN  Cell [phone]  Pager: [phone]    HPI:   Mr. Samuel Austin is a 77-year-old male with past medical history of coronary artery disease s/p CABG, hypertension, diabetes, COPD, recent dialysis dependent SKYLER who presented to [facility] after having a syncopal episode. Patient mentions that he had been feeling weak and had bilateral lower extremity weakness. Patient mentions that he had been having some dark bowel movements and some diarrhea. Also had some nausea and poor intake. Denies any chest pain shortness of breath or edema. Patient mentions that recently he was admitted to [facility] for MI, with status post cardiac cath developed SKYLER.   SKYLER was thought to be secondary to contrast-induced nephropathy. Patient needed at least 3-4 sessions of dialysis in the hospital and was set up outpatient in [place]. Patient has not had any more outpatient dialysis. In [facility] he was seen by Jan Fagan from [facility] kidney specialist.       Past Medical History:   Diagnosis Date    CAD (coronary artery disease)     Heart Attack 10/2020    Chronic kidney disease     Diabetes ([address]     Hypertension       Past Surgical History:   Procedure Laterality Date    CARDIAC SURG PROCEDURE UNLIST      Bypass x 4     HX ORTHOPAEDIC      Back     HX OTHER SURGICAL      Skin cancer        Social History     Socioeconomic History    Marital status: SINGLE     Spouse name: Not on file    Number of children: Not on file    Years of education: Not on file    Highest education level: Not on file   Occupational History    Not on file   Social Needs    Financial resource strain: Not on file    Food insecurity     Worry: Not on file     Inability: Not on file    Transportation needs     Medical: Not on file     Non-medical: Not on file   Tobacco Use    Smoking status: Former Smoker     Last attempt to quit: 3/1/2020     Years since quittin.6    Smokeless tobacco: Never Used   Substance and Sexual Activity    Alcohol use: Not Currently    Drug use: Yes     Types: Marijuana     Comment: 30 years ago per patient.     Sexual activity: Not on file   Lifestyle    Physical activity     Days per week: Not on file     Minutes per session: Not on file    Stress: Not on file   Relationships    Social connections     Talks on phone: Not on file     Gets together: Not on file     Attends [employer] service: Not on file     Active member of club or organization: Not on file     Attends meetings of clubs or organizations: Not on file     Relationship status: Not on file    Intimate partner violence     Fear of current or ex partner: Not on file     Emotionally abused: Not on file     Physically abused: Not on file     Forced sexual activity: Not on file   Other Topics Concern    Not on file   Social History Narrative    Not on file       History reviewed. No pertinent family history. Allergies   Allergen Reactions    Penicillins Hives and Itching        Home Medications:     Medications Prior to Admission   Medication Sig    aspirin delayed-release 81 mg tablet Take 81 mg by mouth daily.  atorvastatin (LIPITOR) 80 mg tablet Take 80 mg by mouth daily.  albuterol sulfate (PROAIR RESPICLICK) 90 mcg/actuation breath activated inhaler Take 2 Puffs by inhalation every four (4) hours as needed for Wheezing.  albuterol-ipratropium (DUO-NEB) 2.5 mg-0.5 mg/3 ml nebu 3 mL by Nebulization route every four (4) hours as needed for Wheezing.  carvediloL (COREG) 6.25 mg tablet Take 6.25 mg by mouth two (2) times daily (with meals).  clindamycin (CLEOCIN) 300 mg capsule Take 300 mg by mouth four (4) times daily.  clopidogreL (PLAVIX) 75 mg tab Take 75 mg by mouth daily.  cyanocobalamin 1,000 mcg tablet Take 1,000 mcg by mouth daily.  famotidine (PEPCID) 20 mg tablet Take 20 mg by mouth two (2) times a day.  fluticasone furoate-vilanteroL (Breo Ellipta) 100-25 mcg/dose inhaler Take 1 Puff by inhalation daily.  fluticasone propionate (FLOVENT DISKUS) 50 mcg/actuation inhaler Take 1 Puff by inhalation.  guaiFENesin ER (MUCINEX) 600 mg ER tablet Take 600 mg by mouth two (2) times a day.  insulin glargine (Lantus U-100 Insulin) 100 unit/mL injection 10 Units by SubCUTAneous route nightly.  isosorbide mononitrate ER (IMDUR) 30 mg tablet Take 30 mg by mouth daily.  loratadine (CLARITIN) 10 mg tablet Take 10 mg by mouth daily.  nicotinic acid (NIACIN) 500 mg tablet Take 2,500 mg by mouth two (2) times daily (with meals).  nicotine (Nicoderm CQ) 21 mg/24 hr 1 Patch by TransDERmal route every twenty-four (24) hours.     multivit-min/folic/vit K/lycop (ONE-A-DAY MEN'S MULTIVITAMIN PO) Take 1 Tab by mouth daily.  polyethylene glycol (Miralax) 17 gram/dose powder Take 17 g by mouth daily. Current Inpatient Medications:     Current Facility-Administered Medications   Medication Dose Route Frequency    heparin (porcine) 1,000 unit/mL injection 5,000 Units  5,000 Units InterCATHeter DIALYSIS PRN    sodium chloride (NS) flush 5-40 mL  5-40 mL IntraVENous Q8H    sodium chloride (NS) flush 5-40 mL  5-40 mL IntraVENous PRN    acetaminophen (TYLENOL) tablet 650 mg  650 mg Oral Q6H PRN    Or    acetaminophen (TYLENOL) suppository 650 mg  650 mg Rectal Q6H PRN    polyethylene glycol (MIRALAX) packet 17 g  17 g Oral DAILY PRN    promethazine (PHENERGAN) tablet 12.5 mg  12.5 mg Oral Q6H PRN    Or    ondansetron (ZOFRAN) injection 4 mg  4 mg IntraVENous Q6H PRN    pantoprazole (PROTONIX) 40 mg in 0.9% sodium chloride 10 mL injection  40 mg IntraVENous Q12H    glucose chewable tablet 16 g  4 Tab Oral PRN    glucagon (GLUCAGEN) injection 1 mg  1 mg IntraMUSCular PRN    dextrose (D50W) injection syrg 12.5-25 g  25-50 mL IntraVENous PRN    atorvastatin (LIPITOR) tablet 80 mg  80 mg Oral QHS    albuterol-ipratropium (DUO-NEB) 2.5 MG-0.5 MG/3 ML  3 mL Nebulization Q4H PRN    budesonide (PULMICORT) 500 mcg/2 ml nebulizer suspension  500 mcg Nebulization BID RT    lactated Ringers infusion  25 mL/hr IntraVENous CONTINUOUS    sucralfate (CARAFATE) tablet 1 g  1 g Oral Q6H    insulin glargine (LANTUS) injection 5 Units  5 Units SubCUTAneous QHS    insulin lispro (HUMALOG) injection   SubCUTAneous Q6H       Review of Systems:   No fever or chills. No sore throat. No cough or hemoptysis. No shortness of breath or chest pain. No orthopnea or paroxysmal nocturnal dyspnea. Good appetite. No nausea, vomiting, abdominal pain, melena or hematochezia. No constipation or diarrhea. No dysuria, no gross hematuria of voiding difficulties.  No ankle swelling, no joint paints. No muscle aches. No skin changes. No dizziness or lightheadedness. No headaches. Physical Assessment:     Vitals:    10/16/20 1230 10/16/20 1245 10/16/20 1300 10/16/20 1315   BP: 129/60 128/74 (!) 140/71 128/72   Pulse: 97 99 94 100   Resp:       Temp:       TempSrc:       SpO2:       Weight:       Height:         Last 3 Recorded Weights in this Encounter    10/15/20 1444 10/16/20 0452   Weight: 85.7 kg (189 lb) 88.1 kg (194 lb 3.2 oz)     Admission weight: Weight: 85.7 kg (189 lb) (10/15/20 1444)      Intake/Output Summary (Last 24 hours) at 10/16/2020 1332  Last data filed at 10/16/2020 0856  Gross per 24 hour   Intake 366.25 ml   Output    Net 366.25 ml     Patient is in no apparent distress. HEENT: Head is normocephalic and atraumatic. Neck: no cervical lymphadenopathy or thyromegaly. Lungs: good air entry, clear to auscultation bilaterally. Cardiovascular system: S1, S2, regular rate and rhythm. Abdomen: soft, non tender, non distended. Extremities: no edema     Data Review:    Labs: Results:       Chemistry Recent Labs     10/16/20  0508 10/15/20  0230   * 329*    134*   K 3.5 4.0    96*   CO2 25 24   BUN 82* 66*   CREA 11.70* 10.40*   CA 8.4* 8.6   AGAP 13 14   BUCR 7* 6*   AP 42* 53   TP 5.1* 6.1*   ALB 2.8* 3.0*   GLOB 2.3 3.1   AGRAT 1.2 1.0         CBC w/Diff Recent Labs     10/16/20  0828 10/16/20  0508 10/15/20  2340  10/15/20  0230   WBC  --  12.5  --   --  7.6   RBC  --  2.34*  --   --  2.25*   HGB 7.4* 7.2* 7.5*   < > 6.9*   HCT 22.6* 21.9* 23.1*   < > 22.0*   PLT  --  150  --   --  141   GRANS  --   --   --   --  80*   LYMPH  --   --   --   --  17*   EOS  --   --   --   --  0    < > = values in this interval not displayed. Iron/Ferritin No results for input(s): IRON in the last 72 hours. No lab exists for component: TIBCCALC   PTH/VIT D No results for input(s): PTH in the last 72 hours.     No lab exists for component: VITD           Briana Arango B MD [facility]  10/16/2020  1:32 PM      October 16, 2020

## 2020-10-16 NOTE — PROGRESS NOTES
[facility] - Progress Note  Admit Date: 10/15/2020    Assessment:     -Acute symptomatic blood loss anemia with Hgb 6.9 requiring transfusion, presented with progressive weakness and near syncope. -GIB with occult positive stool, EGD 10/14/20 revealed bleeding duodenal ulcer. On ASA and Plavix as outpatient. -CAD s/p CABG 1990s (LIMA to LAD, SVG to RCA, SVG to OM/PLV) with recent STEMI requiring PCI to LIMA to LAD with JHONATHAN 9/23/2020 (known chronically occluded SVG to RCA, known 30% disease at anastomotic site of SVG to OM/PLV)  -Ischemic CMY with EF 25% by echo 10/1/2020 (15% 9/23/2020, 50% 7/2020). Plan for outpatient discussion of AICD, was not fitted for lifvest.  -ESRD recently started on HD following recent heart catheterization, possible cardiorenal syndrome versus/contrast induced SKYLER/ATN in the setting of diuresis. Patient reports this is hopefully temporary.  -Chronic HFrEF. -Chronic anemia. Known recent anemia with heme negative stool at [facility]. -Hypertension.  -Dyslipidemia. -Diabetes mellitus. -COPD. -Tobacco use. -Poor dentition. Recent extraction.     Primary cardiologist Dr. Nasim Nguyen. Plan:     Noted EGD with bleeding duodenal ulcer. Need to resume DAPT ASAP given recent PCI, noted GI recommendations to hold plavix 2-4 days, restart when ok with GI. Resume BB if BP will allow. Continued on statin. Volume managed with HD. No ace or arb given recent SKYLER requiring HD. Plan is for patient to discuss AICD consideration as outpatient with primary cardiologist.    I saw, examined, and evaluated the patient. I personally reviewed the patient's labs, tests, vitals, orders, medications, updated history, and other providers assessments. I personally agree with the findings as stated and the plan as documented. Subjective:     No new complaints.      Objective:      Patient Vitals for the past 8 hrs:   Temp Pulse Resp BP SpO2   10/16/20 0800  (!) 104      10/16/20 0716 98 °F (36.7 °C) 89 18 107/60 98 %   10/16/20 0452 98.7 °F (37.1 °C) (!) 114 18 (!) 99/56 97 %   10/16/20 0345  (!) 108            Patient Vitals for the past 96 hrs:   Weight   10/16/20 0452 88.1 kg (194 lb 3.2 oz)   10/15/20 1444 85.7 kg (189 lb)                    Intake/Output Summary (Last 24 hours) at 10/16/2020 0942  Last data filed at 10/16/2020 0856  Gross per 24 hour   Intake 366.25 ml   Output    Net 366.25 ml       Physical Exam:  General:  alert, cooperative, no distress, appears stated age  Neck:  no JVD  Lungs:  clear to auscultation bilaterally  Heart:  regular rate and rhythm  Abdomen:  abdomen is soft without significant tenderness, masses, organomegaly or guarding  Extremities:  extremities normal, atraumatic, no cyanosis or edema    Data Review:     Labs: Results:       Chemistry Recent Labs     10/16/20  0508 10/15/20  0230   * 329*    134*   K 3.5 4.0    96*   CO2 25 24   BUN 82* 66*   CREA 11.70* 10.40*   CA 8.4* 8.6   AGAP 13 14   BUCR 7* 6*   AP 42* 53   TP 5.1* 6.1*   ALB 2.8* 3.0*   GLOB 2.3 3.1   AGRAT 1.2 1.0      CBC w/Diff Recent Labs     10/16/20  0828 10/16/20  0508 10/15/20  2340  10/15/20  0230   WBC  --  12.5  --   --  7.6   RBC  --  2.34*  --   --  2.25*   HGB 7.4* 7.2* 7.5*   < > 6.9*   HCT 22.6* 21.9* 23.1*   < > 22.0*   PLT  --  150  --   --  141   GRANS  --   --   --   --  80*   LYMPH  --   --   --   --  17*   EOS  --   --   --   --  0    < > = values in this interval not displayed.       Cardiac Enzymes No results found for: CPK, CK, CKMMB, CKMB, RCK3, CKMBT, CKNDX, CKND1, SAYRA, TROPT, TROIQ, RAVEN, TROPT, TNIPOC, BNP, BNPP   Coagulation Recent Labs     10/15/20  0230   PTP 14.9   INR 1.2   APTT 25.1       Lipid Panel Lab Results   Component Value Date/Time    Cholesterol, total 89 11/19/2013 11:33 AM    HDL Cholesterol 29 (L) 11/19/2013 11:33 AM    LDL, calculated 47.6 11/19/2013 11:33 AM    VLDL, calculated 12.4 11/19/2013 11:33 AM    Triglyceride 62 11/19/2013 11:33 AM    CHOL/HDL Ratio 3.1 11/19/2013 11:33 AM      BNP No results found for: BNP, BNPP, XBNPT   Liver Enzymes Recent Labs     10/16/20  0508   TP 5.1*   ALB 2.8*   AP 42*      Digoxin    Thyroid Studies No results found for: T4, T3U, TSH, TSHEXT       Signed By: MANDI Oliva     October 16, 2020

## 2020-10-16 NOTE — PROGRESS NOTES
conducted an initial consultation and Spiritual Assessment for Moiz Engel, who is a 65 y. o.,male. Patient's Primary Language is: [place]. According to the patient's EMR Tenriism Affiliation is: No preference. The reason the Patient came to the hospital is:   Patient Active Problem List    Diagnosis Date Noted    Dizziness 10/15/2020    Anemia 10/15/2020    GIB (gastrointestinal bleeding) 10/15/2020        The  provided the following Interventions:   was unable to assess,  patient was off the floor. Plan:  Chaplains will continue to follow and will provide pastoral care on an as needed/requested basis.  recommends bedside caregivers page  on duty if patient shows signs of acute spiritual or emotional distress.     [id number] [facility]   [phone]

## 2020-10-16 NOTE — PERIOP NOTES
TRANSFER - OUT REPORT:    Verbal report given to Gracie GREEN(name) on [employer]  being transferred to CVT Stepdown(unit) for routine post - op       Report consisted of patients Situation, Background, Assessment and   Recommendations(SBAR). Information from the following report(s) SBAR, OR Summary, Procedure Summary, Intake/Output and MAR was reviewed with the receiving nurse. Lines:   Peripheral IV 10/15/20 Left Forearm (Active)   Site Assessment Clean, dry, & intact 10/15/20 0240   Phlebitis Assessment 0 10/15/20 0240   Infiltration Assessment 0 10/15/20 0240   Dressing Status Clean, dry, & intact 10/15/20 0240   Dressing Type Transparent 10/15/20 0240   Hub Color/Line Status Pink;Flushed;Patent 10/15/20 0240        Opportunity for questions and clarification was provided.       Patient transported with:   Registered Nurse

## 2020-10-16 NOTE — PROGRESS NOTES
Physician Progress Note      PATIENT:               Lewis Houston  CSN #:                  [id number]  :                       1950  ADMIT DATE:       10/15/2020 2:01 AM  DISCH DATE:  RESPONDING  PROVIDER #:        Denia Hinds MD          QUERY TEXT:    Pt admitted with anemia/GI bleed and has CHF documented. If possible, please document in progress notes and discharge summary further specificity regarding the type and acuity of CHF:      The medical record reflects the following:  Risk Factors: CHF, HTN, ESRD, CAD, Cardiomyopathy, S/p STEMI with LIMA to LAD stent 2 weeks ago at [facility]  Clinical Indicators: Per H&P -CHF, cardiomyopathy with EF of 15-25 % 2 weeks ago  Treatment: Cardiology consulted, antihypertensives    Thank you,  Kenneth Guerrero, RN, CDI  [phone]  Options provided:  -- Chronic Systolic CHF/HFrEF  -- Chronic Systolic and Diastolic CHF  -- Other - I will add my own diagnosis  -- Disagree - Not applicable / Not valid  -- Disagree - Clinically unable to determine / Unknown  -- Refer to Clinical Documentation Reviewer    PROVIDER RESPONSE TEXT:    This patient has chronic systolic CHF/HFrEF. Query created by: Facundo Castillo on 10/15/2020 2:35 PM      QUERY TEXT:    Patient admitted with GI bleed/anemia and is on chronic anticoagulation. If possible, please document in the progress notes and discharge summary if you are evaluating and/or treating any of the following: The medical record reflects the following:  Risk Factors: Pt on Plavix and aspirin s/p stent x2 weeks ago  Clinical Indicators: Per GI consult GI bleed - melena for past week, STEMI with stent placement 2 weeks ago at [facility], placed on Plavix and aspirin. Treatment: GI consulted, EGD, s/p PRBC    Thank you,  Kenneth Guerrero, RN, CDI  [phone]  Options provided:  -- GI bleed due to anticoagulation.   -- Bleeding unrelated to anticoagulation  -- Other - I will add my own diagnosis  -- Disagree - Not applicable / Not valid  -- Disagree - Clinically unable to determine / Unknown  -- Refer to Clinical Documentation Reviewer    PROVIDER RESPONSE TEXT:    GI bleed from duodenal ulcer in the setting of aspirin and plavix use    Query created by: Naima Jane on 10/15/2020 2:40 PM      Electronically signed by:  Anay Geller MD 10/16/2020 8:40 AM

## 2020-10-16 NOTE — DIABETES MGMT
Glycemic Control Plan of Care    T2DM with A1c of 6.7% (Myrna, 9/24/2020)  Home diabetes medications: Patient reported on 10/16:  Lantus insulin 10 units daily at bedtime    Seen patient in dialysis unit this afternoon. He was admitted on 10/15/2020 with GI bleed and noted report of EGD report of duodenal ulcer with clot oozing    Recommendation(s): continue current insulin orders: lantus and sliding scale lispro. Glycemic assessment:    POC BG 10/15: 307, 305  POC BG 10/16 at time of review: 177  Lab FBG 10/16: 145        Current A1C: 6.7% (Myrna, 9/24/2020) which is equivalent to estimated average blood glucose of 146 mg/dL during the past 2-3 months    Current hospital diabetes medications:  Basal lantus insulin 5 units daily at bedtime  Correctional lispro insulin ACHS.  Normal sensitivity dose    Total daily dose insulin requirement previous day: 10/15:  Lantus: 5 units  Lispro: 16 units  TDD insulin: 21 units    Diet: NPO    Goals:  Blood glucose will be within target range of  mg/dL by 10/19/2020     Education:  ___  Refer to Diabetes Education Record             _X__  Education not indicated at this time    Angel Heredia RN [facility]  Pager: [phone]

## 2020-10-17 LAB
ANION GAP SERPL CALC-SCNC: 11 MMOL/L (ref 3–18)
BASOPHILS # BLD: 0 K/UL (ref 0–0.1)
BASOPHILS NFR BLD: 0 % (ref 0–2)
BUN SERPL-MCNC: 32 MG/DL (ref 7–18)
BUN/CREAT SERPL: 5 (ref 12–20)
CALCIUM SERPL-MCNC: 8 MG/DL (ref 8.5–10.1)
CHLORIDE SERPL-SCNC: 105 MMOL/L (ref 100–111)
CO2 SERPL-SCNC: 25 MMOL/L (ref 21–32)
CREAT SERPL-MCNC: 6.09 MG/DL (ref 0.6–1.3)
DIFFERENTIAL METHOD BLD: ABNORMAL
EOSINOPHIL # BLD: 0 K/UL (ref 0–0.4)
EOSINOPHIL NFR BLD: 0 % (ref 0–5)
ERYTHROCYTE [DISTWIDTH] IN BLOOD BY AUTOMATED COUNT: 22.1 % (ref 11.6–14.5)
GLUCOSE BLD STRIP.AUTO-MCNC: 152 MG/DL (ref 70–110)
GLUCOSE BLD STRIP.AUTO-MCNC: 154 MG/DL (ref 70–110)
GLUCOSE BLD STRIP.AUTO-MCNC: 182 MG/DL (ref 70–110)
GLUCOSE BLD STRIP.AUTO-MCNC: 206 MG/DL (ref 70–110)
GLUCOSE BLD STRIP.AUTO-MCNC: 229 MG/DL (ref 70–110)
GLUCOSE SERPL-MCNC: 179 MG/DL (ref 74–99)
HCT VFR BLD AUTO: 21.1 % (ref 36–48)
HCT VFR BLD AUTO: 21.7 % (ref 36–48)
HCT VFR BLD AUTO: 22.6 % (ref 36–48)
HGB BLD-MCNC: 6.9 G/DL (ref 13–16)
HGB BLD-MCNC: 7.1 G/DL (ref 13–16)
HGB BLD-MCNC: 7.5 G/DL (ref 13–16)
HISTORY CHECKED?,CKHIST: NORMAL
LYMPHOCYTES # BLD: 1.1 K/UL (ref 0.9–3.6)
LYMPHOCYTES NFR BLD: 10 % (ref 21–52)
MAGNESIUM SERPL-MCNC: 1.7 MG/DL (ref 1.6–2.6)
MCH RBC QN AUTO: 31.1 PG (ref 24–34)
MCHC RBC AUTO-ENTMCNC: 32.7 G/DL (ref 31–37)
MCV RBC AUTO: 95.2 FL (ref 74–97)
MONOCYTES # BLD: 0.7 K/UL (ref 0.05–1.2)
MONOCYTES NFR BLD: 6 % (ref 3–10)
NEUTS SEG # BLD: 9.2 K/UL (ref 1.8–8)
NEUTS SEG NFR BLD: 84 % (ref 40–73)
PLATELET # BLD AUTO: 131 K/UL (ref 135–420)
PLATELET COMMENTS,PCOM: ABNORMAL
PMV BLD AUTO: 9 FL (ref 9.2–11.8)
POTASSIUM SERPL-SCNC: 3.9 MMOL/L (ref 3.5–5.5)
RBC # BLD AUTO: 2.28 M/UL (ref 4.7–5.5)
RBC MORPH BLD: ABNORMAL
SODIUM SERPL-SCNC: 141 MMOL/L (ref 136–145)
WBC # BLD AUTO: 11 K/UL (ref 4.6–13.2)

## 2020-10-17 PROCEDURE — 85018 HEMOGLOBIN: CPT

## 2020-10-17 PROCEDURE — 80048 BASIC METABOLIC PNL TOTAL CA: CPT

## 2020-10-17 PROCEDURE — 94640 AIRWAY INHALATION TREATMENT: CPT

## 2020-10-17 PROCEDURE — 99232 SBSQ HOSP IP/OBS MODERATE 35: CPT | Performed by: EMERGENCY MEDICINE

## 2020-10-17 PROCEDURE — 74011250636 HC RX REV CODE- 250/636: Performed by: ANESTHESIOLOGY

## 2020-10-17 PROCEDURE — 74011250637 HC RX REV CODE- 250/637: Performed by: INTERNAL MEDICINE

## 2020-10-17 PROCEDURE — 74011000250 HC RX REV CODE- 250: Performed by: EMERGENCY MEDICINE

## 2020-10-17 PROCEDURE — 36415 COLL VENOUS BLD VENIPUNCTURE: CPT

## 2020-10-17 PROCEDURE — 74011000250 HC RX REV CODE- 250: Performed by: INTERNAL MEDICINE

## 2020-10-17 PROCEDURE — C9113 INJ PANTOPRAZOLE SODIUM, VIA: HCPCS | Performed by: INTERNAL MEDICINE

## 2020-10-17 PROCEDURE — 74011250636 HC RX REV CODE- 250/636: Performed by: INTERNAL MEDICINE

## 2020-10-17 PROCEDURE — 83735 ASSAY OF MAGNESIUM: CPT

## 2020-10-17 PROCEDURE — 85025 COMPLETE CBC W/AUTO DIFF WBC: CPT

## 2020-10-17 PROCEDURE — 74011636637 HC RX REV CODE- 636/637: Performed by: EMERGENCY MEDICINE

## 2020-10-17 PROCEDURE — 82962 GLUCOSE BLOOD TEST: CPT

## 2020-10-17 PROCEDURE — 65660000004 HC RM CVT STEPDOWN

## 2020-10-17 PROCEDURE — P9016 RBC LEUKOCYTES REDUCED: HCPCS

## 2020-10-17 PROCEDURE — 36430 TRANSFUSION BLD/BLD COMPNT: CPT

## 2020-10-17 RX ORDER — METOPROLOL TARTRATE 25 MG/1
12.5 TABLET, FILM COATED ORAL 2 TIMES DAILY
Status: DISCONTINUED | OUTPATIENT
Start: 2020-10-17 | End: 2020-10-18

## 2020-10-17 RX ORDER — SODIUM CHLORIDE 9 MG/ML
250 INJECTION, SOLUTION INTRAVENOUS AS NEEDED
Status: DISCONTINUED | OUTPATIENT
Start: 2020-10-17 | End: 2020-10-20 | Stop reason: HOSPADM

## 2020-10-17 RX ORDER — MIDODRINE HYDROCHLORIDE 5 MG/1
5 TABLET ORAL
Status: DISCONTINUED | OUTPATIENT
Start: 2020-10-17 | End: 2020-10-17

## 2020-10-17 RX ADMIN — SUCRALFATE 1 G: 1 TABLET ORAL at 18:15

## 2020-10-17 RX ADMIN — INSULIN LISPRO 2 UNITS: 100 INJECTION, SOLUTION INTRAVENOUS; SUBCUTANEOUS at 07:02

## 2020-10-17 RX ADMIN — SUCRALFATE 1 G: 1 TABLET ORAL at 13:45

## 2020-10-17 RX ADMIN — SODIUM CHLORIDE 40 MG: 9 INJECTION INTRAMUSCULAR; INTRAVENOUS; SUBCUTANEOUS at 22:01

## 2020-10-17 RX ADMIN — SODIUM CHLORIDE, SODIUM LACTATE, POTASSIUM CHLORIDE, AND CALCIUM CHLORIDE 25 ML/HR: 600; 310; 30; 20 INJECTION, SOLUTION INTRAVENOUS at 18:19

## 2020-10-17 RX ADMIN — BUDESONIDE 500 MCG: 0.5 INHALANT RESPIRATORY (INHALATION) at 21:24

## 2020-10-17 RX ADMIN — INSULIN LISPRO 4 UNITS: 100 INJECTION, SOLUTION INTRAVENOUS; SUBCUTANEOUS at 00:38

## 2020-10-17 RX ADMIN — SODIUM CHLORIDE 40 MG: 9 INJECTION INTRAMUSCULAR; INTRAVENOUS; SUBCUTANEOUS at 09:06

## 2020-10-17 RX ADMIN — SODIUM CHLORIDE 10 ML: 9 INJECTION, SOLUTION INTRAMUSCULAR; INTRAVENOUS; SUBCUTANEOUS at 22:04

## 2020-10-17 RX ADMIN — METOPROLOL TARTRATE 12.5 MG: 25 TABLET, FILM COATED ORAL at 18:15

## 2020-10-17 RX ADMIN — BUDESONIDE 500 MCG: 0.5 INHALANT RESPIRATORY (INHALATION) at 08:30

## 2020-10-17 RX ADMIN — SUCRALFATE 1 G: 1 TABLET ORAL at 07:02

## 2020-10-17 RX ADMIN — INSULIN GLARGINE 5 UNITS: 100 INJECTION, SOLUTION SUBCUTANEOUS at 22:02

## 2020-10-17 RX ADMIN — INSULIN LISPRO 2 UNITS: 100 INJECTION, SOLUTION INTRAVENOUS; SUBCUTANEOUS at 18:15

## 2020-10-17 RX ADMIN — SUCRALFATE 1 G: 1 TABLET ORAL at 00:34

## 2020-10-17 RX ADMIN — SODIUM CHLORIDE 10 ML: 9 INJECTION, SOLUTION INTRAMUSCULAR; INTRAVENOUS; SUBCUTANEOUS at 13:46

## 2020-10-17 RX ADMIN — INSULIN LISPRO 4 UNITS: 100 INJECTION, SOLUTION INTRAVENOUS; SUBCUTANEOUS at 13:44

## 2020-10-17 RX ADMIN — SODIUM CHLORIDE 10 ML: 9 INJECTION, SOLUTION INTRAMUSCULAR; INTRAVENOUS; SUBCUTANEOUS at 07:06

## 2020-10-17 RX ADMIN — METOPROLOL TARTRATE 1.25 MG: 5 INJECTION INTRAVENOUS at 07:01

## 2020-10-17 RX ADMIN — ATORVASTATIN CALCIUM 80 MG: 40 TABLET, FILM COATED ORAL at 22:01

## 2020-10-17 NOTE — PROGRESS NOTES
12:57- PT eval order received and chart reviewed. Unable to see patient at this time as patient is receiving a blood transfusion. Will follow up as patient schedule allows. Thank you for this referral. Danny Gonzalez, PT, DPT.

## 2020-10-17 NOTE — PROGRESS NOTES
[facility] Hospitalist Group  Progress Note    Patient: Argelia Anderson Age: 65 y.o. : 1950 MR#: [id number] SSN: [id number]  Date/Time: 10/17/2020    Subjective:     Patient is laying in bed in no apparent distress. Denies any chest pain or shortness of breath    Assessment/Plan:     Acute blood loss anemia  GI bleed  Duodenal ulcer with active bleeding noted on EGD  Coronary artery disease, recent STEMI requiring PCI and stent  Ischemic cardiomyopathy EF of 25%  End-stage renal disease   chronic systolic congestive heart failure  Hypertension   COPD without acute exacerbation    Plan  Status post EGD which showed a bleeding duodenal ulcer  On liquid diet  Continue PPI, monitor hemoglobin and hematocrit  Transfuse 1 unit PRBCs today for hemoglobin of 6.9  GI input noted  Monitor labs  Hemodialysis as per nephrology monitor blood pressure  Low-dose beta-blocker per cardiology  PT OT  Discussed with patient. I offered again to call patient's family but patient declined. Patient states that he just spoke to them and updated them and that his family is very aware about these medical conditions as his brother also has significant heart disease and a heart transplant.     Case discussed with:  [x]Patient  []Family  []Nursing  []Case Management  DVT Prophylaxis:  []Lovenox  []Hep SQ  [x]SCDs  []Coumadin   []On Heparin gtt    Objective:   VS:   Visit Vitals  /72 (BP 1 Location: Left arm, BP Patient Position: At rest)   Pulse (!) 103   Temp 99.2 °F (37.3 °C)   Resp 18   Ht 5' 9\" (1.753 m)   Wt 87.1 kg (192 lb)   SpO2 99%   BMI 28.35 kg/m²      Tmax/24hrs: Temp (24hrs), Av.5 °F (36.9 °C), Min:97.1 °F (36.2 °C), Max:99.2 °F (37.3 °C)    Input/Output:     Intake/Output Summary (Last 24 hours) at 10/17/2020 1529  Last data filed at 10/16/2020 1938  Gross per 24 hour   Intake 175 ml   Output 50 ml   Net 125 ml       General:  Awake, alert  Cardiovascular:  S1S2+, RRR  Pulmonary: Decreased breath sounds at bilateral bases  GI:  Soft, BS+, NT, ND  Extremities:  trace edema        Labs:    Recent Results (from the past 24 hour(s))   GLUCOSE, POC    Collection Time: 10/16/20  4:06 PM   Result Value Ref Range    Glucose (POC) 152 (H) 70 - 110 mg/dL   HGB & HCT    Collection Time: 10/16/20  4:45 PM   Result Value Ref Range    HGB 6.9 (L) 13.0 - 16.0 g/dL    HCT 21.0 (L) 36.0 - 48.0 %   GLUCOSE, POC    Collection Time: 10/17/20 12:27 AM   Result Value Ref Range    Glucose (POC) 206 (H) 70 - 110 mg/dL   CBC WITH AUTOMATED DIFF    Collection Time: 10/17/20  1:26 AM   Result Value Ref Range    WBC 11.0 4.6 - 13.2 K/uL    RBC 2.28 (L) 4.70 - 5.50 M/uL    HGB 7.1 (L) 13.0 - 16.0 g/dL    HCT 21.7 (L) 36.0 - 48.0 %    MCV 95.2 74.0 - 97.0 FL    MCH 31.1 24.0 - 34.0 PG    MCHC 32.7 31.0 - 37.0 g/dL    RDW 22.1 (H) 11.6 - 14.5 %    PLATELET 046 (L) 577 - 420 K/uL    MPV 9.0 (L) 9.2 - 11.8 FL    NEUTROPHILS 84 (H) 40 - 73 %    LYMPHOCYTES 10 (L) 21 - 52 %    MONOCYTES 6 3 - 10 %    EOSINOPHILS 0 0 - 5 %    BASOPHILS 0 0 - 2 %    ABS. NEUTROPHILS 9.2 (H) 1.8 - 8.0 K/UL    ABS. LYMPHOCYTES 1.1 0.9 - 3.6 K/UL    ABS. MONOCYTES 0.7 0.05 - 1.2 K/UL    ABS. EOSINOPHILS 0.0 0.0 - 0.4 K/UL    ABS.  BASOPHILS 0.0 0.0 - 0.1 K/UL    DF AUTOMATED      PLATELET COMMENTS ADEQUATE PLATELETS      RBC COMMENTS ANISOCYTOSIS  2+        RBC COMMENTS POLYCHROMASIA  1+        RBC COMMENTS POIKILOCYTOSIS  1+        RBC COMMENTS SCHISTOCYTES  1+        RBC COMMENTS HAILEY CELLS  1+       METABOLIC PANEL, BASIC    Collection Time: 10/17/20  1:26 AM   Result Value Ref Range    Sodium 141 136 - 145 mmol/L    Potassium 3.9 3.5 - 5.5 mmol/L    Chloride 105 100 - 111 mmol/L    CO2 25 21 - 32 mmol/L    Anion gap 11 3.0 - 18 mmol/L    Glucose 179 (H) 74 - 99 mg/dL    BUN 32 (H) 7.0 - 18 MG/DL    Creatinine 6.09 (H) 0.6 - 1.3 MG/DL    BUN/Creatinine ratio 5 (L) 12 - 20      GFR est AA 11 (L) >60 ml/min/1.73m2    GFR est non-AA 9 (L) >60 ml/min/1.73m2    Calcium 8.0 (L) 8.5 - 10.1 MG/DL   MAGNESIUM    Collection Time: 10/17/20  1:26 AM   Result Value Ref Range    Magnesium 1.7 1.6 - 2.6 mg/dL   HGB & HCT    Collection Time: 10/17/20  5:23 AM   Result Value Ref Range    HGB 6.9 (L) 13.0 - 16.0 g/dL    HCT 21.1 (L) 36.0 - 48.0 %   GLUCOSE, POC    Collection Time: 10/17/20  6:22 AM   Result Value Ref Range    Glucose (POC) 152 (H) 70 - 110 mg/dL   GLUCOSE, POC    Collection Time: 10/17/20  7:37 AM   Result Value Ref Range    Glucose (POC) 154 (H) 70 - 110 mg/dL   RBC, ALLOCATE    Collection Time: 10/17/20  7:45 AM   Result Value Ref Range    HISTORY CHECKED?  Historical check performed    GLUCOSE, POC    Collection Time: 10/17/20 12:28 PM   Result Value Ref Range    Glucose (POC) 229 (H) 70 - 110 mg/dL   GLUCOSE, POC    Collection Time: 10/17/20  3:26 PM   Result Value Ref Range    Glucose (POC) 182 (H) 70 - 110 mg/dL     Additional Data Reviewed:      Signed By: Ashley Chris MD     October 17, 2020

## 2020-10-17 NOTE — PROGRESS NOTES
15:20- Second attempt to see pt for PT evaluation, patient still receiving blood transfusion. Will check back tomorrow as schedule allows.  Thank you.    -Elvin Esposito, PT, DPT

## 2020-10-17 NOTE — PROGRESS NOTES
Problem: Falls - Risk of  Goal: *Absence of Falls  Description: Document Albino Messing Fall Risk and appropriate interventions in the flowsheet.   Outcome: Progressing Towards Goal  Note: Fall Risk Interventions:       Mentation Interventions: Adequate sleep, hydration, pain control, Bed/chair exit alarm, Door open when patient unattended, More frequent rounding, Reorient patient, Update white board, Room close to nurse's station, Increase mobility         Elimination Interventions: Call light in reach, Bed/chair exit alarm, Stay With Me (per policy)    History of Falls Interventions: Room close to nurse's station, Evaluate medications/consider consulting pharmacy, Investigate reason for fall, Door open when patient unattended, Bed/chair exit alarm         Problem: Nutrition Deficit  Goal: *Optimize nutritional status  Outcome: Progressing Towards Goal

## 2020-10-17 NOTE — PROGRESS NOTES
[facility]  -  Progress Note      Patient: Shun Roca MRN: [id number]  SSN: [id number]    YOB: 1950  Age: 65 y.o. Sex: male      Admit Date: 10/15/2020    Assessment:     Hospital Problems  Never Reviewed          Codes Class Noted POA    Dizziness ICD-10-CM: R17  ICD-9-CM: 780.4  10/15/2020 Unknown        Anemia ICD-10-CM: D64.9  ICD-9-CM: 285.9  10/15/2020 Unknown        GIB (gastrointestinal bleeding) ICD-10-CM: K92.2  ICD-9-CM: 578.9  10/15/2020 Unknown            -Acute symptomatic blood loss anemia with Hgb 6.9 requiring transfusion, presented with progressive weakness and near syncope. Another unit of pRBCs to be transfused today.   -Acute upper GI bleed, EGD 10/14/20 revealed bleeding duodenal ulcer. On ASA and Plavix as outpatient for recent PCI. -CAD s/p CABG 1990s (LIMA to LAD, SVG to RCA, SVG to OM/PLV) with recent anterior wall  STEMI requiring PCI to LIMA to LAD at the anastomosis site when he was found of 100% thrombotic occlusion with JHONATHAN (2.25 x 16 mm) 9/23/2020 (known chronically occluded SVG to RCA, known 30% disease at anastomotic site of SVG to OM/PLV)  -Ischemic CMY with EF 25% by echo 10/1/2020 (15% 9/23/2020, 50% 7/2020). Plan for outpatient discussion of AICD, was not fitted for lifevest.  -ESRD recently started on HD following recent heart catheterization, possible cardiorenal syndrome versus/contrast induced SKYLER/ATN in the setting of diuresis. Patient reports this is hopefully temporary.  -Chronic HFrEF. -Chronic anemia. Known recent anemia with heme negative stool at [facility]. -Hypertension.  -Dyslipidemia. -Diabetes mellitus. -COPD. -Tobacco use. -Poor dentition. Recent extraction.     Primary cardiologist Dr. Karol Gutierrez. Plan:     Patient with very small diameter stent placed 3-1/2 weeks ago at the anastomosis site of his LIMA to LAD graft where he was found of 100% thrombotic occlusion.   This site and size of the stent is at very high risk for restenosis/thrombosis while off antiplatelet agents. I would recommend resuming antiplatelet therapy Monday and monitoring the patient for several days for evidence of rebleed or new ischemic events. Discussed at length with the patient today. Reasonable to continue high-dose potent statin. Will change IV metoprolol to a very low dose of oral metoprolol  We will stop midodrine, as patient likely was hypotensive due to acute blood loss anemia. This should improve with additional blood transfusions. Would transfuse to keep his hemoglobin at least 8. Patient should remain in CVT stepdown. He is a new dialysis    Subjective:     No complaints this morning. No pain. Has been taking in clear liquids without difficulty.      Objective:      Patient Vitals for the past 8 hrs:   Temp Pulse Resp BP SpO2   10/17/20 0733 98.3 °F (36.8 °C) 99 20 97/60 100 %   10/17/20 0532 97.1 °F (36.2 °C) 95 20 125/69 95 %         Patient Vitals for the past 96 hrs:   Weight   10/16/20 0452 88.1 kg (194 lb 3.2 oz)   10/15/20 1444 85.7 kg (189 lb)         Intake/Output Summary (Last 24 hours) at 10/17/2020 0821  Last data filed at 10/16/2020 1938  Gross per 24 hour   Intake 175 ml   Output 150 ml   Net 25 ml       Physical Exam:  General:  alert, cooperative, no distress, appears stated age  Neck:  nontender, no JVD  Lungs:  clear to auscultation bilaterally  Heart:  regular rate and rhythm, S1, S2 normal, no murmur, click, rub or gallop  Abdomen:  abdomen is soft without significant tenderness, masses, organomegaly or guarding  Extremities:  extremities normal, atraumatic, no cyanosis or edema    Data Review:     Labs: Results:       Chemistry Recent Labs     10/17/20  0126 10/16/20  0508 10/15/20  0230   * 145* 329*    138 134*   K 3.9 3.5 4.0    100 96*   CO2 25 25 24   BUN 32* 82* 66*   CREA 6.09* 11.70* 10.40*   CA 8.0* 8.4* 8.6   MG 1.7  --   --    AGAP 11 13 14   BUCR 5* 7* 6*   AP  --  42* 53   TP --  5.1* 6.1*   ALB  --  2.8* 3.0*   GLOB  --  2.3 3.1   AGRAT  --  1.2 1.0      CBC w/Diff Recent Labs     10/17/20  0523 10/17/20  0126 10/16/20  1645  10/16/20  0508  10/15/20  0230   WBC  --  11.0  --   --  12.5  --  7.6   RBC  --  2.28*  --   --  2.34*  --  2.25*   HGB 6.9* 7.1* 6.9*   < > 7.2*   < > 6.9*   HCT 21.1* 21.7* 21.0*   < > 21.9*   < > 22.0*   PLT  --  131*  --   --  150  --  141   GRANS  --  84*  --   --   --   --  80*   LYMPH  --  10*  --   --   --   --  17*   EOS  --  0  --   --   --   --  0    < > = values in this interval not displayed.       Cardiac Enzymes No results found for: CPK, CK, CKMMB, CKMB, RCK3, CKMBT, CKNDX, CKND1, SAYRA, TROPT, TROIQ, RAVEN, TROPT, TNIPOC, BNP, BNPP   Coagulation Recent Labs     10/15/20  0230   PTP 14.9   INR 1.2   APTT 25.1       Lipid Panel Lab Results   Component Value Date/Time    Cholesterol, total 89 11/19/2013 11:33 AM    HDL Cholesterol 29 (L) 11/19/2013 11:33 AM    LDL, calculated 47.6 11/19/2013 11:33 AM    VLDL, calculated 12.4 11/19/2013 11:33 AM    Triglyceride 62 11/19/2013 11:33 AM    CHOL/HDL Ratio 3.1 11/19/2013 11:33 AM      BNP No results found for: BNP, BNPP, XBNPT   Liver Enzymes Recent Labs     10/16/20  0508   TP 5.1*   ALB 2.8*   AP 42*      Digoxin    Thyroid Studies No results found for: T4, T3U, TSH, TSHEXT       Ender Hoskins MD

## 2020-10-17 NOTE — PROGRESS NOTES
No pain. No nausea or vomiting. Small amount of blood last night. PE:   Visit Vitals  BP 97/60 (BP 1 Location: Left arm, BP Patient Position: At rest)   Pulse 99   Temp 98.3 °F (36.8 °C)   Resp 20   Ht 5' 9\" (1.753 m)   Wt 88.1 kg (194 lb 3.2 oz)   SpO2 98%   BMI 28.68 kg/m²     Abdomen soft BS  Present  Neuro alert communicative and lucid. Recent Results (from the past 12 hour(s))   GLUCOSE, POC    Collection Time: 10/17/20 12:27 AM   Result Value Ref Range    Glucose (POC) 206 (H) 70 - 110 mg/dL   CBC WITH AUTOMATED DIFF    Collection Time: 10/17/20  1:26 AM   Result Value Ref Range    WBC 11.0 4.6 - 13.2 K/uL    RBC 2.28 (L) 4.70 - 5.50 M/uL    HGB 7.1 (L) 13.0 - 16.0 g/dL    HCT 21.7 (L) 36.0 - 48.0 %    MCV 95.2 74.0 - 97.0 FL    MCH 31.1 24.0 - 34.0 PG    MCHC 32.7 31.0 - 37.0 g/dL    RDW 22.1 (H) 11.6 - 14.5 %    PLATELET 423 (L) 813 - 420 K/uL    MPV 9.0 (L) 9.2 - 11.8 FL    NEUTROPHILS 84 (H) 40 - 73 %    LYMPHOCYTES 10 (L) 21 - 52 %    MONOCYTES 6 3 - 10 %    EOSINOPHILS 0 0 - 5 %    BASOPHILS 0 0 - 2 %    ABS. NEUTROPHILS 9.2 (H) 1.8 - 8.0 K/UL    ABS. LYMPHOCYTES 1.1 0.9 - 3.6 K/UL    ABS. MONOCYTES 0.7 0.05 - 1.2 K/UL    ABS. EOSINOPHILS 0.0 0.0 - 0.4 K/UL    ABS.  BASOPHILS 0.0 0.0 - 0.1 K/UL    DF AUTOMATED      PLATELET COMMENTS ADEQUATE PLATELETS      RBC COMMENTS ANISOCYTOSIS  2+        RBC COMMENTS POLYCHROMASIA  1+        RBC COMMENTS POIKILOCYTOSIS  1+        RBC COMMENTS SCHISTOCYTES  1+        RBC COMMENTS HAILEY CELLS  1+       METABOLIC PANEL, BASIC    Collection Time: 10/17/20  1:26 AM   Result Value Ref Range    Sodium 141 136 - 145 mmol/L    Potassium 3.9 3.5 - 5.5 mmol/L    Chloride 105 100 - 111 mmol/L    CO2 25 21 - 32 mmol/L    Anion gap 11 3.0 - 18 mmol/L    Glucose 179 (H) 74 - 99 mg/dL    BUN 32 (H) 7.0 - 18 MG/DL    Creatinine 6.09 (H) 0.6 - 1.3 MG/DL    BUN/Creatinine ratio 5 (L) 12 - 20      GFR est AA 11 (L) >60 ml/min/1.73m2    GFR est non-AA 9 (L) >60 ml/min/1.73m2 Calcium 8.0 (L) 8.5 - 10.1 MG/DL   MAGNESIUM    Collection Time: 10/17/20  1:26 AM   Result Value Ref Range    Magnesium 1.7 1.6 - 2.6 mg/dL   HGB & HCT    Collection Time: 10/17/20  5:23 AM   Result Value Ref Range    HGB 6.9 (L) 13.0 - 16.0 g/dL    HCT 21.1 (L) 36.0 - 48.0 %   GLUCOSE, POC    Collection Time: 10/17/20  6:22 AM   Result Value Ref Range    Glucose (POC) 152 (H) 70 - 110 mg/dL   GLUCOSE, POC    Collection Time: 10/17/20  7:37 AM   Result Value Ref Range    Glucose (POC) 154 (H) 70 - 110 mg/dL   RBC, ALLOCATE    Collection Time: 10/17/20  7:45 AM   Result Value Ref Range    HISTORY CHECKED? Historical check performed      A/P: ESRD with PUD with bleeding. S/p epi and Bicap to control bleeding. MI recently. Ok to start anticoagulation Monday but risk of bleeding remains. High dose BID ppi. COPD.  BC Gates MD Event Note

## 2020-10-17 NOTE — PROGRESS NOTES
In Patient Progress note      Admit Date: 10/15/2020          Impression:     #1 Dialysis dependent SKYLER , Monday Wednesday Friday, etiology contrast nephropathy, missed dialysis on Wednesday. Dialysis today for volume and solute. Patient's creatinine is 11, volume status looks okay. He does seem to be making some urine, need to follow renal parameters closely to assess for recovery. #2 GI bleed due to duodenal ulcer, noted GI consultation  #3 Acute blood loss anemia  #4 Coronary artery disease s/p CABG  #5 Diabetes mellitus     Plan:     #1 next HD Monday   #2 continue Monday Wednesday Friday dialysis while in hospital  #3 PRBC transfusion as needed, defer to primary  #4 follow GI recommendations  #5 patient has a [facility] chair in [place] where he should follow-up after discharge. He does have Monday Wednesday Friday schedule.   #6 JASS with HD   #7 add midodrine     No need for HD today   Please call with questions, chest congestion     Sammie Talavera MD FASN  Cell [phone]  Pager: [phone]    Subjective:       Denies SOB/CP      Current Facility-Administered Medications:     0.9% sodium chloride infusion 250 mL, 250 mL, IntraVENous, PRN, Rosalino Snyder MD    midodrine (PROAMATINE) tablet 5 mg, 5 mg, Oral, TID WITH MEALS, Viola Crouch MD    heparin (porcine) 1,000 unit/mL injection 5,000 Units, 5,000 Units, InterCATHeter, DIALYSIS PRN, Bette Jacobs MD, 5,000 Units at 10/16/20 1415    metoprolol (LOPRESSOR) injection 1.25 mg, 1.25 mg, IntraVENous, Q6H, Roland Duran MD, 1.25 mg at 10/17/20 0701    sodium chloride (NS) flush 5-40 mL, 5-40 mL, IntraVENous, Q8H, Abram Baker MD, 10 mL at 10/17/20 0706    sodium chloride (NS) flush 5-40 mL, 5-40 mL, IntraVENous, PRN, Abram aBker MD, 10 mL at 10/15/20 0615    acetaminophen (TYLENOL) tablet 650 mg, 650 mg, Oral, Q6H PRN **OR** acetaminophen (TYLENOL) suppository 650 mg, 650 mg, Rectal, Q6H PRN, RoscoeKrishna Philippe MD    polyethylene glycol (MIRALAX) packet 17 g, 17 g, Oral, DAILY PRN, Abram Baker MD    promethazine (PHENERGAN) tablet 12.5 mg, 12.5 mg, Oral, Q6H PRN **OR** ondansetron (ZOFRAN) injection 4 mg, 4 mg, IntraVENous, Q6H PRN, Abram Baker MD    pantoprazole (PROTONIX) 40 mg in 0.9% sodium chloride 10 mL injection, 40 mg, IntraVENous, Q12H, Abram Baker MD, 40 mg at 10/17/20 0906    glucose chewable tablet 16 g, 4 Tab, Oral, PRN, Abram Baker MD    glucagon (GLUCAGEN) injection 1 mg, 1 mg, IntraMUSCular, PRN, Abram Baker MD    dextrose (D50W) injection syrg 12.5-25 g, 25-50 mL, IntraVENous, PRN, Abram Baker MD    atorvastatin (LIPITOR) tablet 80 mg, 80 mg, Oral, QHS, Abram Baker MD, 80 mg at 10/16/20 2148    albuterol-ipratropium (DUO-NEB) 2.5 MG-0.5 MG/3 ML, 3 mL, Nebulization, Q4H PRN, Abram Baker MD    budesonide (PULMICORT) 500 mcg/2 ml nebulizer suspension, 500 mcg, Nebulization, BID RT, Abram Baker MD, 500 mcg at 10/17/20 0830    lactated Ringers infusion, 25 mL/hr, IntraVENous, CONTINUOUS, Reg Rogers MD, Last Rate: 25 mL/hr at 10/15/20 1501, 25 mL/hr at 10/15/20 1501    sucralfate (CARAFATE) tablet 1 g, 1 g, Oral, Q6H, Imelda Gutierrez MD, 1 g at 10/17/20 0702    insulin glargine (LANTUS) injection 5 Units, 5 Units, SubCUTAneous, QHS, Roland Duran MD, 5 Units at 10/16/20 2147    insulin lispro (HUMALOG) injection, , SubCUTAneous, Q6H, Roland Duran MD, 2 Units at 10/17/20 0702        Objective:     Visit Vitals  BP 97/60 (BP 1 Location: Left arm, BP Patient Position: At rest)   Pulse 99   Temp 98.3 °F (36.8 °C)   Resp 20   Ht 5' 9\" (1.753 m)   Wt 88.1 kg (194 lb 3.2 oz)   SpO2 98%   BMI 28.68 kg/m²         Intake/Output Summary (Last 24 hours) at 10/17/2020 1013  Last data filed at 10/16/2020 1938  Gross per 24 hour   Intake 175 ml   Output 50 ml   Net 125 ml       Physical Exam:      Patient is in no apparent distress.    HEENT: Head is normocephalic and atraumatic. Neck: no cervical lymphadenopathy or thyromegaly. Lungs: good air entry, clear to auscultation bilaterally. Cardiovascular system: S1, S2, regular rate and rhythm. Abdomen: soft, non tender, non distended.   Extremities: no edema   Rt IJ TDC     Data Review:    Recent Labs     10/17/20  0523 10/17/20  0126   WBC  --  11.0   RBC  --  2.28*   HCT 21.1* 21.7*   MCV  --  95.2   MCH  --  31.1   MCHC  --  32.7   RDW  --  22.1*     Recent Labs     10/17/20  0126 10/16/20  0508 10/15/20  0230   BUN 32* 82* 66*   CREA 6.09* 11.70* 10.40*   CA 8.0* 8.4* 8.6   ALB  --  2.8* 3.0*   K 3.9 3.5 4.0    138 134*    100 96*   CO2 25 25 24   * 145* 329*       Nya Blandon MD

## 2020-10-18 LAB
ABO + RH BLD: NORMAL
BLD PROD TYP BPU: NORMAL
BLOOD GROUP ANTIBODIES SERPL: NORMAL
BPU ID: NORMAL
CALLED TO:,BCALL1: NORMAL
CROSSMATCH RESULT,%XM: NORMAL
GLUCOSE BLD STRIP.AUTO-MCNC: 173 MG/DL (ref 70–110)
GLUCOSE BLD STRIP.AUTO-MCNC: 174 MG/DL (ref 70–110)
GLUCOSE BLD STRIP.AUTO-MCNC: 185 MG/DL (ref 70–110)
GLUCOSE BLD STRIP.AUTO-MCNC: 191 MG/DL (ref 70–110)
GLUCOSE BLD STRIP.AUTO-MCNC: 213 MG/DL (ref 70–110)
HCT VFR BLD AUTO: 23.2 % (ref 36–48)
HCT VFR BLD AUTO: 24.4 % (ref 36–48)
HCT VFR BLD AUTO: 24.7 % (ref 36–48)
HGB BLD-MCNC: 7.6 G/DL (ref 13–16)
HGB BLD-MCNC: 8 G/DL (ref 13–16)
HGB BLD-MCNC: 8.2 G/DL (ref 13–16)
SPECIMEN EXP DATE BLD: NORMAL
STATUS OF UNIT,%ST: NORMAL
UNIT DIVISION, %UDIV: 0

## 2020-10-18 PROCEDURE — 82962 GLUCOSE BLOOD TEST: CPT

## 2020-10-18 PROCEDURE — 74011250636 HC RX REV CODE- 250/636: Performed by: INTERNAL MEDICINE

## 2020-10-18 PROCEDURE — 65660000004 HC RM CVT STEPDOWN

## 2020-10-18 PROCEDURE — 74011250637 HC RX REV CODE- 250/637: Performed by: INTERNAL MEDICINE

## 2020-10-18 PROCEDURE — 74011000250 HC RX REV CODE- 250: Performed by: INTERNAL MEDICINE

## 2020-10-18 PROCEDURE — 99232 SBSQ HOSP IP/OBS MODERATE 35: CPT | Performed by: PHYSICIAN ASSISTANT

## 2020-10-18 PROCEDURE — 99232 SBSQ HOSP IP/OBS MODERATE 35: CPT | Performed by: FAMILY MEDICINE

## 2020-10-18 PROCEDURE — 97161 PT EVAL LOW COMPLEX 20 MIN: CPT

## 2020-10-18 PROCEDURE — 74011636637 HC RX REV CODE- 636/637: Performed by: EMERGENCY MEDICINE

## 2020-10-18 PROCEDURE — 94640 AIRWAY INHALATION TREATMENT: CPT

## 2020-10-18 PROCEDURE — 36415 COLL VENOUS BLD VENIPUNCTURE: CPT

## 2020-10-18 PROCEDURE — 85018 HEMOGLOBIN: CPT

## 2020-10-18 PROCEDURE — C9113 INJ PANTOPRAZOLE SODIUM, VIA: HCPCS | Performed by: INTERNAL MEDICINE

## 2020-10-18 RX ORDER — METOPROLOL TARTRATE 25 MG/1
25 TABLET, FILM COATED ORAL 2 TIMES DAILY
Status: DISCONTINUED | OUTPATIENT
Start: 2020-10-18 | End: 2020-10-20 | Stop reason: HOSPADM

## 2020-10-18 RX ORDER — CLOPIDOGREL BISULFATE 75 MG/1
75 TABLET ORAL DAILY
Status: DISCONTINUED | OUTPATIENT
Start: 2020-10-19 | End: 2020-10-20 | Stop reason: HOSPADM

## 2020-10-18 RX ORDER — METOPROLOL TARTRATE 5 MG/5ML
1.25 INJECTION INTRAVENOUS
Status: DISCONTINUED | OUTPATIENT
Start: 2020-10-18 | End: 2020-10-20 | Stop reason: HOSPADM

## 2020-10-18 RX ORDER — INSULIN LISPRO 100 [IU]/ML
INJECTION, SOLUTION INTRAVENOUS; SUBCUTANEOUS
Status: DISCONTINUED | OUTPATIENT
Start: 2020-10-18 | End: 2020-10-20 | Stop reason: HOSPADM

## 2020-10-18 RX ADMIN — BUDESONIDE 500 MCG: 0.5 INHALANT RESPIRATORY (INHALATION) at 08:39

## 2020-10-18 RX ADMIN — SUCRALFATE 1 G: 1 TABLET ORAL at 17:07

## 2020-10-18 RX ADMIN — SODIUM CHLORIDE 40 MG: 9 INJECTION INTRAMUSCULAR; INTRAVENOUS; SUBCUTANEOUS at 09:02

## 2020-10-18 RX ADMIN — ATORVASTATIN CALCIUM 80 MG: 40 TABLET, FILM COATED ORAL at 22:25

## 2020-10-18 RX ADMIN — INSULIN LISPRO 2 UNITS: 100 INJECTION, SOLUTION INTRAVENOUS; SUBCUTANEOUS at 09:02

## 2020-10-18 RX ADMIN — INSULIN LISPRO 2 UNITS: 100 INJECTION, SOLUTION INTRAVENOUS; SUBCUTANEOUS at 00:04

## 2020-10-18 RX ADMIN — METOPROLOL TARTRATE 12.5 MG: 25 TABLET, FILM COATED ORAL at 09:03

## 2020-10-18 RX ADMIN — SUCRALFATE 1 G: 1 TABLET ORAL at 00:04

## 2020-10-18 RX ADMIN — BUDESONIDE 500 MCG: 0.5 INHALANT RESPIRATORY (INHALATION) at 19:50

## 2020-10-18 RX ADMIN — SODIUM CHLORIDE 10 ML: 9 INJECTION, SOLUTION INTRAMUSCULAR; INTRAVENOUS; SUBCUTANEOUS at 17:08

## 2020-10-18 RX ADMIN — METOPROLOL TARTRATE 25 MG: 25 TABLET, FILM COATED ORAL at 17:07

## 2020-10-18 RX ADMIN — SODIUM CHLORIDE 10 ML: 9 INJECTION, SOLUTION INTRAMUSCULAR; INTRAVENOUS; SUBCUTANEOUS at 06:36

## 2020-10-18 RX ADMIN — SUCRALFATE 1 G: 1 TABLET ORAL at 12:06

## 2020-10-18 RX ADMIN — SUCRALFATE 1 G: 1 TABLET ORAL at 06:36

## 2020-10-18 RX ADMIN — INSULIN LISPRO 2 UNITS: 100 INJECTION, SOLUTION INTRAVENOUS; SUBCUTANEOUS at 17:07

## 2020-10-18 RX ADMIN — INSULIN LISPRO 4 UNITS: 100 INJECTION, SOLUTION INTRAVENOUS; SUBCUTANEOUS at 12:05

## 2020-10-18 NOTE — PROGRESS NOTES
Attempted to call patient to complete initial assessment , unable to RICHIE Leal Case Management [phone]

## 2020-10-18 NOTE — PROGRESS NOTES
Feels well. No melena. No pain. Tired. PE:  Visit Vitals  /70 (BP 1 Location: Left arm, BP Patient Position: At rest)   Pulse (!) 102   Temp 97.6 °F (36.4 °C)   Resp 18   Ht 5' 9\" (1.753 m)   Wt 87.5 kg (193 lb)   SpO2 98%   BMI 28.50 kg/m²     Abdomen soft BS present  Neuro non focal.    Recent Results (from the past 12 hour(s))   GLUCOSE, POC    Collection Time: 10/18/20 12:03 AM   Result Value Ref Range    Glucose (POC) 174 (H) 70 - 110 mg/dL   HGB & HCT    Collection Time: 10/18/20 12:48 AM   Result Value Ref Range    HGB 7.6 (L) 13.0 - 16.0 g/dL    HCT 23.2 (L) 36.0 - 48.0 %   GLUCOSE, POC    Collection Time: 10/18/20  7:36 AM   Result Value Ref Range    Glucose (POC) 173 (H) 70 - 110 mg/dL     A/P: Duodenal ulcer with blood clot treated on 15th of this month with Bicap and epi - stable. Continue PPI BID po from tomorrow. Plavix should be started tomorrow am. D/w Dr. William Gamble.     Federico Ashton MD

## 2020-10-18 NOTE — PROGRESS NOTES
Problem: Falls - Risk of  Goal: *Absence of Falls  Description: Document Nicko Apo Fall Risk and appropriate interventions in the flowsheet. Outcome: Progressing Towards Goal  Note: Fall Risk Interventions:  Mobility Interventions: Bed/chair exit alarm    Mentation Interventions: Adequate sleep, hydration, pain control    Medication Interventions: Evaluate medications/consider consulting pharmacy, Patient to call before getting OOB    Elimination Interventions: Call light in reach, Patient to call for help with toileting needs    History of Falls Interventions: Bed/chair exit alarm         Problem: Patient Education: Go to Patient Education Activity  Goal: Patient/Family Education  Outcome: Progressing Towards Goal     Problem: Nutrition Deficit  Goal: *Optimize nutritional status  Outcome: Progressing Towards Goal     Problem: Pressure Injury - Risk of  Goal: *Prevention of pressure injury  Description: Document Marcus Scale and appropriate interventions in the flowsheet.   Outcome: Progressing Towards Goal  Note: Pressure Injury Interventions:       Moisture Interventions: Maintain skin hydration (lotion/cream)    Activity Interventions: Pressure redistribution bed/mattress(bed type)    Mobility Interventions: HOB 30 degrees or less    Nutrition Interventions: Document food/fluid/supplement intake    Friction and Shear Interventions: HOB 30 degrees or less                Problem: Patient Education: Go to Patient Education Activity  Goal: Patient/Family Education  Outcome: Progressing Towards Goal

## 2020-10-18 NOTE — PROGRESS NOTES
[facility] Hospitalist Group  Progress Note    Patient: Tanya Cho Age: 65 y.o. : 1950 MR#: [id number] SSN: [id number]  Date/Time: 10/18/2020    Subjective:     Patient sitting in chair today, NAD  Denies any further bleeding from rectum  Denies any chest pain or shortness of breath    Assessment/Plan:     1. Acute blood loss anemia  2. GI bleed  3. Duodenal ulcer with active bleeding noted on EGD  4. Coronary artery disease, recent STEMI requiring PCI and stent  5. Ischemic cardiomyopathy EF of 25%  6. End-stage renal disease  7. Chronic systolic congestive heart failure  8. Hypertension  9. COPD without acute exacerbation    Cardiology, GI and nephrology following  HD per nephrology  Cardiology to consider fitting patient with Nanda Schultz prior to discharge  Status post EGD on 10/15 which showed a bleeding duodenal ulcer  Restart Plavix without aspirin tomorrow a.m. We will need to monitor as inpatient for 48 hours after beginning Plavix  Increase metoprolol to 25 mg twice daily  Continue PPI twice daily  Follow-up H&H, BMP  PT, OTrecommending home with [place]  Palliative care consulted      Case discussed with:  [x]Patient  []Family  []Nursing  []Case Management  DVT Prophylaxis:  []Lovenox  []Hep SQ  [x]SCDs  []Coumadin   []On Heparin gtt  Diet: Cardiac  CODE STATUS: Full  Disposition: Stable for transfer to telemetry, greater than 2 nights    H.  Lydia Arias, DO   2020       Objective:   VS:   Visit Vitals  /71 (BP 1 Location: Left arm, BP Patient Position: At rest)   Pulse (!) 115   Temp 97.8 °F (36.6 °C)   Resp 18   Ht 5' 9\" (1.753 m)   Wt 87.5 kg (193 lb)   SpO2 98%   BMI 28.50 kg/m²      Tmax/24hrs: Temp (24hrs), Av °F (36.7 °C), Min:97.6 °F (36.4 °C), Max:99 °F (37.2 °C)    Input/Output:     Intake/Output Summary (Last 24 hours) at 10/18/2020 0549  Last data filed at 10/18/2020 1200  Gross per 24 hour   Intake 922.08 ml   Output 650 ml   Net 272.08 ml General:  Awake, alert  Cardiovascular:  S1S2+, RRR  Pulmonary: Decreased breath sounds at bilateral bases  GI:  Soft, BS+, NT, ND  Extremities:  trace edema        Labs:    Recent Results (from the past 24 hour(s))   HGB & HCT    Collection Time: 10/17/20  6:50 PM   Result Value Ref Range    HGB 7.5 (L) 13.0 - 16.0 g/dL    HCT 22.6 (L) 36.0 - 48.0 %   GLUCOSE, POC    Collection Time: 10/18/20 12:03 AM   Result Value Ref Range    Glucose (POC) 174 (H) 70 - 110 mg/dL   HGB & HCT    Collection Time: 10/18/20 12:48 AM   Result Value Ref Range    HGB 7.6 (L) 13.0 - 16.0 g/dL    HCT 23.2 (L) 36.0 - 48.0 %   GLUCOSE, POC    Collection Time: 10/18/20  7:36 AM   Result Value Ref Range    Glucose (POC) 173 (H) 70 - 110 mg/dL   GLUCOSE, POC    Collection Time: 10/18/20 11:03 AM   Result Value Ref Range    Glucose (POC) 213 (H) 70 - 110 mg/dL   HGB & HCT    Collection Time: 10/18/20 11:53 AM   Result Value Ref Range    HGB 8.2 (L) 13.0 - 16.0 g/dL    HCT 24.7 (L) 36.0 - 48.0 %   GLUCOSE, POC    Collection Time: 10/18/20  3:13 PM   Result Value Ref Range    Glucose (POC) 191 (H) 70 - 110 mg/dL       Disclaimer: Sections of this note are dictated using utilizing voice recognition software, which may have resulted in some phonetic based errors in grammar and contents. Even though attempts were made to correct all the mistakes, some may have been missed, and remained in the body of the document. If questions arise, please contact our department.

## 2020-10-18 NOTE — PROGRESS NOTES
Problem: Falls - Risk of  Goal: *Absence of Falls  Description: Document Amena Jose Fall Risk and appropriate interventions in the flowsheet. Outcome: Progressing Towards Goal  Note: Fall Risk Interventions:  Mobility Interventions: Bed/chair exit alarm    Mentation Interventions: Adequate sleep, hydration, pain control, Bed/chair exit alarm    Medication Interventions: Patient to call before getting OOB, Bed/chair exit alarm, Teach patient to arise slowly    Elimination Interventions: Bed/chair exit alarm, Call light in reach, Urinal in reach    History of Falls Interventions: Door open when patient unattended, Bed/chair exit alarm         Problem: Patient Education: Go to Patient Education Activity  Goal: Patient/Family Education  Outcome: Progressing Towards Goal     Problem: Nutrition Deficit  Goal: *Optimize nutritional status  Outcome: Progressing Towards Goal     Problem: Pressure Injury - Risk of  Goal: *Prevention of pressure injury  Description: Document Marcus Scale and appropriate interventions in the flowsheet.   Outcome: Progressing Towards Goal  Note: Pressure Injury Interventions:       Moisture Interventions: Absorbent underpads, Minimize layers    Activity Interventions: Pressure redistribution bed/mattress(bed type), PT/OT evaluation    Mobility Interventions: Pressure redistribution bed/mattress (bed type)    Nutrition Interventions: Document food/fluid/supplement intake    Friction and Shear Interventions: Minimize layers                Problem: Patient Education: Go to Patient Education Activity  Goal: Patient/Family Education  Outcome: Progressing Towards Goal     Problem: Patient Education: Go to Patient Education Activity  Goal: Patient/Family Education  Outcome: Progressing Towards Goal

## 2020-10-18 NOTE — ROUTINE PROCESS
1945 Bedside and Verbal shift change  Received from [place], [place] (outgoing nurse), to MARILYN Donald (oncoming)  Pt. Is AOX 4. IV patent and infusing well, Pt. denies  pain at this time. Report included the following information SBAR, Kardex, Procedure Summary, Intake/Output, MAR, Recent Lab Results, and  Cardiac Rhythm @ [facility]. Will resume care and monitor Pt. Condition. Pt. Educated on call bell when in need of help and assistance. Pt. verbalized understanding. Bed alarm on. 
 
 Pt. Head to toe Assessment Done and documented. 2030 Pt. Not in distress. 2200  Pt in bed, resting comfortably. 0000  Pt. In bed, denies discomfort. 0200  Pt. Resting with eyes closed easily awaken. 0400  Pt. Made no complaints. 0600  Pt. Able to rest and sleep well throughout the shift. Verbal and bedside Shift changed report given to Humera Amin (oncoming RN) on Pt. Condition. Report consisted of patients Situation, History, Activities, intake/output,  Background, Assessment and Recommendations(SBAR). Information from the following report(s) Kardex, order Summary, Lab results and MAR was reviewed with the receiving nurse. Opportunity for questions and clarification was provided.

## 2020-10-18 NOTE — PROGRESS NOTES
Problem: Mobility Impaired (Adult and Pediatric)  Goal: *Acute Goals and Plan of Care (Insert Text)  Description: Physical Therapy Goals  Initiated 10/18/2020 and to be accomplished within 7 day(s)    1. Patient will transfer from bed to chair and chair to bed with modified independence using the least restrictive device. 2.  Patient will perform sit to stand with modified independence. 3.  Patient will ambulate with modified independence for 150 feet with the least restrictive device. To prepare pt for home mobility. PLOF:  Pt lives with his partner in a 1 story apartment with no stairs to enter. Prior to this hospitalization pt was ambulating in home and community with no AD but notes he has been becoming progressively weaker and fatigues more easily over the past year. Outcome: Progressing Towards Goal  PHYSICAL THERAPY EVALUATION    Patient: Mehran Bunn (17 y.o. male)  Date: 10/18/2020  Primary Diagnosis: GIB (gastrointestinal bleeding) [K92.2]  Anemia [D64.9]  Dizziness [R42]  Procedure(s) (LRB):  ENDOSCOPY w/duodenal ulcer coagulation and epinephrine injection (N/A) 3 Days Post-Op   Precautions:    fall    PLOF: See goals section above    ASSESSMENT :  Based on the objective data described below, the patient presents with decreased functional activity tolerance and impaired balance following admission for GI bleed. Pt has received a blood transfusion. Pt was cleared by nursing to work with therapy. Pt was received sitting on edge of bed, agreeable to participate in PT . Pt performed supine-sit with modified independence and sit-stand with supervision . Pt displayed intact sitting balance and good static/fair standing balance, requiring RW for ambulation for improved confidence and stability. Pt ambulated 30 feet within room with CGA, RW. Pt returned to edge of bed, stating he would get up in recliner later in the day.   At conclusion of session, pt was left resting comfortably sitting on bed, needs met, call bell in reach, nurse notified. Patient will benefit from skilled intervention to address the above impairments. Patient's rehabilitation potential is considered to be Good  Factors which may influence rehabilitation potential include:   []         None noted  []         Mental ability/status  [x]         Medical condition  []         Home/family situation and support systems  []         Safety awareness  []         Pain tolerance/management  []         Other:      PLAN :  Recommendations and Planned Interventions:   []           Bed Mobility Training             [x]    Neuromuscular Re-Education  [x]           Transfer Training                   []    Orthotic/Prosthetic Training  [x]           Gait Training                          []    Modalities  [x]           Therapeutic Exercises           []    Edema Management/Control  [x]           Therapeutic Activities            []    Family Training/Education  [x]           Patient Education  []           Other (comment):    Frequency/Duration: Patient will be followed by physical therapy 1-2 times per day to address goals. Discharge Recommendations: Home Health  Further Equipment Recommendations for Discharge: N/A     SUBJECTIVE:   Patient stated Cleveland Akinskward gotten up a few times today already but I don't want to push myself too hard.     OBJECTIVE DATA SUMMARY:     Past Medical History:   Diagnosis Date    CAD (coronary artery disease)     Heart Attack 10/2020    Chronic kidney disease     Diabetes ([facility] [address]     Hypertension      Past Surgical History:   Procedure Laterality Date    CARDIAC SURG PROCEDURE UNLIST      Bypass x 4 1996    HX ORTHOPAEDIC      Back 1996    HX OTHER SURGICAL      Skin cancer 1996     Barriers to Learning/Limitations: None  Compensate with: N/A  Home Situation:  Home Situation  Home Environment: Apartment  # Steps to Enter: 0  One/Two Story Residence: One story  Living Alone: No  Support Systems: Spouse/Significant Other/Partner  Patient Expects to be Discharged to[de-identified] Apartment  Current DME Used/Available at Home: [employer]i Sargent rollator  Critical Behavior:  Neurologic State: Alert  Orientation Level: Oriented X4    Psychosocial  Patient Behaviors: Calm; Cooperative    Strength:    Strength: Within functional limits    Range Of Motion:  AROM: Within functional limits    Functional Mobility:  Bed Mobility:  Rolling: Modified independent  Supine to Sit: Modified independent  Sit to Supine: Modified independent  Transfers:  Sit to Stand: Supervision  Stand to Sit: Supervision    Balance:   Sitting: Intact  Standing: Impaired; Without support  Standing - Static: Good  Standing - Dynamic : Fair  Ambulation/Gait Training:  Distance (ft): 30 Feet (ft)  Assistive Device: Walker, rolling  Ambulation - Level of Assistance: Contact guard assistance     Gait Description (WDL): Exceptions to WDL  Gait Abnormalities: Decreased step clearance        Base of Support: Widened      Pain:  Pain level pre-treatment: 0/10   Pain level post-treatment: 0/10   Pain Intervention(s) : N/A    Activity Tolerance:   Fair +  Please refer to the flowsheet for vital signs taken during this treatment. After treatment:   []         Patient left in no apparent distress sitting up in chair  [x]         Patient left in no apparent distress in bed  [x]         Call bell left within reach  [x]         Nursing notified  []         Caregiver present  [x]         Bed alarm activated  []         SCDs applied    COMMUNICATION/EDUCATION:   [x]         Role of Physical Therapy in the acute care setting. [x]         Fall prevention education was provided and the patient/caregiver indicated understanding. [x]         Patient/family have participated as able in goal setting and plan of care. [x]         Patient/family agree to work toward stated goals and plan of care.   []         Patient understands intent and goals of therapy, but is neutral about his/her participation. []         Patient is unable to participate in goal setting/plan of care: ongoing with therapy staff.  []         Other:     Thank you for this referral.  Sonja Larose, PT   Time Calculation: 15 mins      Eval Complexity: History: MEDIUM  Complexity : 1-2 comorbidities / personal factors will impact the outcome/ POC Exam:LOW Complexity : 1-2 Standardized tests and measures addressing body structure, function, activity limitation and / or participation in recreation  Presentation: MEDIUM Complexity : Evolving with changing characteristics  Clinical Decision Making:Medium Complexity    Overall Complexity:MEDIUM

## 2020-10-18 NOTE — PROGRESS NOTES
[facility]  -  Progress Note      Patient: Joan Jya MRN: [id number]  SSN: [id number]    YOB: 1950  Age: 65 y.o. Sex: male      Admit Date: 10/15/2020    Assessment:     Hospital Problems  Never Reviewed          Codes Class Noted POA    Dizziness ICD-10-CM: J40  ICD-9-CM: 780.4  10/15/2020 Unknown        Anemia ICD-10-CM: D64.9  ICD-9-CM: 285.9  10/15/2020 Unknown        GIB (gastrointestinal bleeding) ICD-10-CM: K92.2  ICD-9-CM: 578.9  10/15/2020 Unknown            -Acute symptomatic blood loss anemia with Hgb 7.6 today after transfusions yesterday.   -Acute upper GI bleed, EGD 10/14/20 revealed bleeding duodenal ulcer. On ASA and Plavix as outpatient for recent PCI. -CAD s/p CABG 1990s (LIMA to LAD, SVG to RCA, SVG to OM/PLV) with recent anterior wall  STEMI requiring PCI to LIMA to LAD at the anastomosis site when he was found of 100% thrombotic occlusion with JHONATHAN (2.25 x 16 mm) 9/23/2020 (known chronically occluded SVG to RCA, known 30% disease at anastomotic site of SVG to OM/PLV)  -Ischemic CMY with EF 25% by echo 10/1/2020 (15% 9/23/2020, 50% 7/2020). Plan for outpatient discussion of AICD, was not fitted for lifevest.  -ESRD recently started on HD following recent heart catheterization, possible cardiorenal syndrome versus/contrast induced SKYLER/ATN in the setting of diuresis. Patient reports this is hopefully temporary.  -Chronic HFrEF. -Chronic anemia. Known recent anemia with heme negative stool at [facility]. -Hypertension.  -Dyslipidemia. -Diabetes mellitus. -COPD. -Tobacco use. -Poor dentition. Recent extraction.     Primary cardiologist Dr. Nasim Nguyen. Plan:   Restating prior comment:  Patient with very small diameter stent placed 3-1/2 weeks ago at the anastomosis site of his LIMA to LAD graft where he was found of 100% thrombotic occlusion. This site and size of the stent is at very high risk for restenosis/thrombosis while off antiplatelet agents.   I would recommend resuming antiplatelet therapy Monday and monitoring the patient for several days for evidence of rebleed or new ischemic events. Discussed at length with the patient regarding his condition. His volume and hemodynamics are stable this morning. Will await to hear from GI on restarting his anticoagulation and appreciate the assistance. His volume status appears stable and does not sound volume overloaded. Suspect his SOB is conditioning related. Will monitor. Rates OK on low dose BB- will monitor and adjust as BP tolerates. Goal to have Hgb at 8. Subjective:     No new complaints. He gets a little short of breath walking around the room but no pain.      Objective:      Patient Vitals for the past 8 hrs:   Temp Pulse Resp BP SpO2   10/18/20 0737 97.6 °F (36.4 °C)  18 127/70 98 %   10/18/20 0400 97.7 °F (36.5 °C) (!) 102 18 130/80 99 %   10/18/20 0004 97.7 °F (36.5 °C) 100 18 134/76 98 %         Patient Vitals for the past 96 hrs:   Weight   10/18/20 0607 87.5 kg (193 lb)   10/17/20 1349 87.1 kg (192 lb)   10/16/20 0452 88.1 kg (194 lb 3.2 oz)   10/15/20 1444 85.7 kg (189 lb)         Intake/Output Summary (Last 24 hours) at 10/18/2020 0749  Last data filed at 10/18/2020 0400  Gross per 24 hour   Intake 773.3 ml   Output 650 ml   Net 123.3 ml       Physical Exam:  General:  alert, cooperative, no distress, appears stated age  Neck:  nontender, no JVD  Lungs:  clear to auscultation bilaterally  Heart:  regular rate and rhythm, S1, S2 normal, no murmur, click, rub or gallop  Extremities:  extremities normal, atraumatic, no cyanosis or edema    Data Review:     Labs: Results:       Chemistry Recent Labs     10/17/20  0126 10/16/20  0508   * 145*    138   K 3.9 3.5    100   CO2 25 25   BUN 32* 82*   CREA 6.09* 11.70*   CA 8.0* 8.4*   MG 1.7  --    AGAP 11 13   BUCR 5* 7*   AP  --  42*   TP  --  5.1*   ALB  --  2.8*   GLOB  --  2.3   AGRAT  --  1.2      CBC w/Diff Recent Labs 10/18/20  0048 10/17/20  1850 10/17/20  0523 10/17/20  0126  10/16/20  0508   WBC  --   --   --  11.0  --  12.5   RBC  --   --   --  2.28*  --  2.34*   HGB 7.6* 7.5* 6.9* 7.1*   < > 7.2*   HCT 23.2* 22.6* 21.1* 21.7*   < > 21.9*   PLT  --   --   --  131*  --  150   GRANS  --   --   --  84*  --   --    LYMPH  --   --   --  10*  --   --    EOS  --   --   --  0  --   --     < > = values in this interval not displayed. Cardiac Enzymes No results found for: CPK, CK, CKMMB, CKMB, RCK3, CKMBT, CKNDX, CKND1, SAYRA, TROPT, TROIQ, RAVEN, TROPT, TNIPOC, BNP, BNPP   Coagulation No results for input(s): PTP, INR, APTT, INREXT in the last 72 hours.     Lipid Panel Lab Results   Component Value Date/Time    Cholesterol, total 89 11/19/2013 11:33 AM    HDL Cholesterol 29 (L) 11/19/2013 11:33 AM    LDL, calculated 47.6 11/19/2013 11:33 AM    VLDL, calculated 12.4 11/19/2013 11:33 AM    Triglyceride 62 11/19/2013 11:33 AM    CHOL/HDL Ratio 3.1 11/19/2013 11:33 AM      BNP No results found for: BNP, BNPP, XBNPT   Liver Enzymes Recent Labs     10/16/20  0508   TP 5.1*   ALB 2.8*   AP 42*      Digoxin    Thyroid Studies No results found for: T4, T3U, TSH, TSHEXT

## 2020-10-19 LAB
ANION GAP SERPL CALC-SCNC: 11 MMOL/L (ref 3–18)
BASOPHILS # BLD: 0 K/UL (ref 0–0.06)
BASOPHILS NFR BLD: 0 % (ref 0–3)
BUN SERPL-MCNC: 33 MG/DL (ref 7–18)
BUN/CREAT SERPL: 6 (ref 12–20)
CALCIUM SERPL-MCNC: 8.1 MG/DL (ref 8.5–10.1)
CHLORIDE SERPL-SCNC: 101 MMOL/L (ref 100–111)
CO2 SERPL-SCNC: 26 MMOL/L (ref 21–32)
CREAT SERPL-MCNC: 5.82 MG/DL (ref 0.6–1.3)
DIFFERENTIAL METHOD BLD: ABNORMAL
EOSINOPHIL # BLD: 0 K/UL (ref 0–0.4)
EOSINOPHIL NFR BLD: 0 % (ref 0–5)
ERYTHROCYTE [DISTWIDTH] IN BLOOD BY AUTOMATED COUNT: 21.2 % (ref 11.6–14.5)
GLUCOSE BLD STRIP.AUTO-MCNC: 153 MG/DL (ref 70–110)
GLUCOSE BLD STRIP.AUTO-MCNC: 186 MG/DL (ref 70–110)
GLUCOSE BLD STRIP.AUTO-MCNC: 194 MG/DL (ref 70–110)
GLUCOSE BLD STRIP.AUTO-MCNC: 239 MG/DL (ref 70–110)
GLUCOSE SERPL-MCNC: 169 MG/DL (ref 74–99)
HCT VFR BLD AUTO: 23 % (ref 36–48)
HCT VFR BLD AUTO: 24.7 % (ref 36–48)
HCT VFR BLD AUTO: 26.2 % (ref 36–48)
HGB BLD-MCNC: 7.6 G/DL (ref 13–16)
HGB BLD-MCNC: 8 G/DL (ref 13–16)
HGB BLD-MCNC: 8.6 G/DL (ref 13–16)
IRON SATN MFR SERPL: 17 % (ref 20–50)
IRON SERPL-MCNC: 35 UG/DL (ref 50–175)
LYMPHOCYTES # BLD: 1 K/UL (ref 0.8–3.5)
LYMPHOCYTES NFR BLD: 10 % (ref 20–51)
MAGNESIUM SERPL-MCNC: 1.5 MG/DL (ref 1.6–2.6)
MCH RBC QN AUTO: 30.5 PG (ref 24–34)
MCHC RBC AUTO-ENTMCNC: 32.4 G/DL (ref 31–37)
MCV RBC AUTO: 94.3 FL (ref 74–97)
MONOCYTES # BLD: 0.9 K/UL (ref 0–1)
MONOCYTES NFR BLD: 9 % (ref 2–9)
NEUTS BAND NFR BLD MANUAL: 1 % (ref 0–5)
NEUTS SEG # BLD: 8.4 K/UL (ref 1.8–8)
NEUTS SEG NFR BLD: 80 % (ref 42–75)
PHOSPHATE SERPL-MCNC: 3.6 MG/DL (ref 2.5–4.9)
PLATELET # BLD AUTO: 134 K/UL (ref 135–420)
PLATELET COMMENTS,PCOM: ABNORMAL
PMV BLD AUTO: 9.1 FL (ref 9.2–11.8)
POTASSIUM SERPL-SCNC: 3.4 MMOL/L (ref 3.5–5.5)
RBC # BLD AUTO: 2.62 M/UL (ref 4.7–5.5)
RBC MORPH BLD: ABNORMAL
SODIUM SERPL-SCNC: 138 MMOL/L (ref 136–145)
TIBC SERPL-MCNC: 210 UG/DL (ref 250–450)
WBC # BLD AUTO: 10.3 K/UL (ref 4.6–13.2)

## 2020-10-19 PROCEDURE — 74011250637 HC RX REV CODE- 250/637: Performed by: INTERNAL MEDICINE

## 2020-10-19 PROCEDURE — 80048 BASIC METABOLIC PNL TOTAL CA: CPT

## 2020-10-19 PROCEDURE — C9113 INJ PANTOPRAZOLE SODIUM, VIA: HCPCS | Performed by: INTERNAL MEDICINE

## 2020-10-19 PROCEDURE — 74011250636 HC RX REV CODE- 250/636: Performed by: ANESTHESIOLOGY

## 2020-10-19 PROCEDURE — 74011636637 HC RX REV CODE- 636/637: Performed by: EMERGENCY MEDICINE

## 2020-10-19 PROCEDURE — 74011250636 HC RX REV CODE- 250/636: Performed by: INTERNAL MEDICINE

## 2020-10-19 PROCEDURE — 85025 COMPLETE CBC W/AUTO DIFF WBC: CPT

## 2020-10-19 PROCEDURE — 74011636637 HC RX REV CODE- 636/637: Performed by: FAMILY MEDICINE

## 2020-10-19 PROCEDURE — 97116 GAIT TRAINING THERAPY: CPT

## 2020-10-19 PROCEDURE — 97165 OT EVAL LOW COMPLEX 30 MIN: CPT

## 2020-10-19 PROCEDURE — 85018 HEMOGLOBIN: CPT

## 2020-10-19 PROCEDURE — 83540 ASSAY OF IRON: CPT

## 2020-10-19 PROCEDURE — 2709999900 HC NON-CHARGEABLE SUPPLY

## 2020-10-19 PROCEDURE — 99222 1ST HOSP IP/OBS MODERATE 55: CPT | Performed by: NURSE PRACTITIONER

## 2020-10-19 PROCEDURE — 74011000250 HC RX REV CODE- 250: Performed by: INTERNAL MEDICINE

## 2020-10-19 PROCEDURE — 86677 HELICOBACTER PYLORI ANTIBODY: CPT

## 2020-10-19 PROCEDURE — 99232 SBSQ HOSP IP/OBS MODERATE 35: CPT | Performed by: FAMILY MEDICINE

## 2020-10-19 PROCEDURE — 65660000000 HC RM CCU STEPDOWN

## 2020-10-19 PROCEDURE — 97535 SELF CARE MNGMENT TRAINING: CPT

## 2020-10-19 PROCEDURE — 97110 THERAPEUTIC EXERCISES: CPT

## 2020-10-19 PROCEDURE — 83735 ASSAY OF MAGNESIUM: CPT

## 2020-10-19 PROCEDURE — 36415 COLL VENOUS BLD VENIPUNCTURE: CPT

## 2020-10-19 PROCEDURE — 82962 GLUCOSE BLOOD TEST: CPT

## 2020-10-19 PROCEDURE — 84100 ASSAY OF PHOSPHORUS: CPT

## 2020-10-19 RX ORDER — INSULIN GLARGINE 100 [IU]/ML
5 INJECTION, SOLUTION SUBCUTANEOUS DAILY
Status: DISCONTINUED | OUTPATIENT
Start: 2020-10-19 | End: 2020-10-20 | Stop reason: HOSPADM

## 2020-10-19 RX ADMIN — INSULIN LISPRO 4 UNITS: 100 INJECTION, SOLUTION INTRAVENOUS; SUBCUTANEOUS at 11:27

## 2020-10-19 RX ADMIN — ATORVASTATIN CALCIUM 80 MG: 40 TABLET, FILM COATED ORAL at 22:23

## 2020-10-19 RX ADMIN — METOPROLOL TARTRATE 25 MG: 25 TABLET, FILM COATED ORAL at 08:27

## 2020-10-19 RX ADMIN — SODIUM CHLORIDE 10 ML: 9 INJECTION, SOLUTION INTRAMUSCULAR; INTRAVENOUS; SUBCUTANEOUS at 13:00

## 2020-10-19 RX ADMIN — POTASSIUM BICARBONATE 40 MEQ: 782 TABLET, EFFERVESCENT ORAL at 09:57

## 2020-10-19 RX ADMIN — SUCRALFATE 1 G: 1 TABLET ORAL at 08:27

## 2020-10-19 RX ADMIN — INSULIN LISPRO 3 UNITS: 100 INJECTION, SOLUTION INTRAVENOUS; SUBCUTANEOUS at 22:23

## 2020-10-19 RX ADMIN — SODIUM CHLORIDE 10 ML: 9 INJECTION, SOLUTION INTRAMUSCULAR; INTRAVENOUS; SUBCUTANEOUS at 08:35

## 2020-10-19 RX ADMIN — INSULIN LISPRO 2 UNITS: 100 INJECTION, SOLUTION INTRAVENOUS; SUBCUTANEOUS at 08:27

## 2020-10-19 RX ADMIN — CLOPIDOGREL BISULFATE 75 MG: 75 TABLET ORAL at 08:28

## 2020-10-19 RX ADMIN — SUCRALFATE 1 G: 1 TABLET ORAL at 23:16

## 2020-10-19 RX ADMIN — SODIUM CHLORIDE 10 ML: 9 INJECTION, SOLUTION INTRAMUSCULAR; INTRAVENOUS; SUBCUTANEOUS at 22:24

## 2020-10-19 RX ADMIN — SUCRALFATE 1 G: 1 TABLET ORAL at 11:27

## 2020-10-19 RX ADMIN — SODIUM CHLORIDE 40 MG: 9 INJECTION INTRAMUSCULAR; INTRAVENOUS; SUBCUTANEOUS at 22:23

## 2020-10-19 RX ADMIN — SODIUM CHLORIDE, SODIUM LACTATE, POTASSIUM CHLORIDE, AND CALCIUM CHLORIDE 25 ML/HR: 600; 310; 30; 20 INJECTION, SOLUTION INTRAVENOUS at 09:03

## 2020-10-19 RX ADMIN — INSULIN LISPRO 3 UNITS: 100 INJECTION, SOLUTION INTRAVENOUS; SUBCUTANEOUS at 16:53

## 2020-10-19 RX ADMIN — SUCRALFATE 1 G: 1 TABLET ORAL at 17:07

## 2020-10-19 RX ADMIN — INSULIN GLARGINE 5 UNITS: 100 INJECTION, SOLUTION SUBCUTANEOUS at 14:40

## 2020-10-19 RX ADMIN — SODIUM CHLORIDE 40 MG: 9 INJECTION INTRAMUSCULAR; INTRAVENOUS; SUBCUTANEOUS at 08:29

## 2020-10-19 RX ADMIN — METOPROLOL TARTRATE 25 MG: 25 TABLET, FILM COATED ORAL at 17:06

## 2020-10-19 NOTE — PROGRESS NOTES
Reason for Admission:  GIB (gastrointestinal bleeding) [K92.2]  Anemia [D64.9]  Dizziness [R42]                 RUR Score:    18%            Plan for utilizing home health:    Pt declining home health at this time. Likelihood of Readmission:   LOW                         Transition of Care Plan:              Initial assessment completed with patient. Cognitive status of patient: oriented to time, place, person and situation. Face sheet information confirmed:  yes. The patient designates his partner Britton Galeazzi to participate in his discharge plan and to receive any needed information. This patient lives in a apartment with his partner. Patient is able to navigate steps as needed. Prior to hospitalization, patient was considered to be independent with ADLs/IADLS : yes . Patient has a current ACP document on file: yes  The partner Serge Cortez will be available to transport patient home upon discharge. The patient already has a borrowed rolling walker medical equipment available in the home. Patient is not currently active with home healt. Patient has not stayed in a skilled nursing facility or rehab. This patient is on dialysis :yes    If yes, hemodialysis patient, pt never went to outpatient dialysis. He was a new HD pt discharged from [facility] and ended up back in the hospital prior to outpatient services. Pt reports that he is being told that his dialysis is temporary and may not need it as an outpatient. Pt reports that he would have a ride if it is needed. Currently, the discharge plan is home with assistance of partner. CM discussed therapy recommendations of home health services. Pt is declining at this time. He states that his partner will be home with him 24/7. The patient states that he can obtain his medications from the pharmacy, and take his medications as directed. Patient's current insurance is Medicare.        Care Management Interventions  PCP Verified by CM: Yes  Mode of Transport at Discharge: Other (see comment)(partner can p/u)  Transition of Care Consult (CM Consult): Discharge Planning  Physical Therapy Consult: Yes  Occupational Therapy Consult: Yes  Current Support Network:  Other(Lives with partner)  Confirm Follow Up Transport: Family  Discharge Location  Discharge Placement: Home with family assistance(Pt declining home health at this time)        [address]  [phone]

## 2020-10-19 NOTE — PROGRESS NOTES
Bedside, Verbal and Written shift change report given to Oriana Curtis RN (oncoming nurse) by Tia Townsend RN (offgoing nurse). Report included the following information SBAR, Intake/Output, MAR, Recent Results and Cardiac Rhythm Sinus Tach. 4708 potassium 3.4, MD notified. Will order replacement. Dialysis canceled due to trending downward of hematology. MD and dialysis notified. Patient informed. 1813 TRANSFER - OUT REPORT:    Verbal report given to Rashid Morfin RN (name) on Noelle Ojeda  being transferred to [place] (unit) for routine progression of care       Report consisted of patients Situation, Background, Assessment and   Recommendations(SBAR). Information from the following report(s) SBAR, Kardex, Intake/Output, MAR, Recent Results and Cardiac Rhythm sinus tach was reviewed with the receiving nurse. Lines:   Peripheral IV 10/15/20 Left Forearm (Active)   Site Assessment Clean, dry, & intact 10/19/20 1604   Phlebitis Assessment 0 10/19/20 1604   Infiltration Assessment 0 10/19/20 1604   Dressing Status Clean, dry, & intact 10/19/20 1604   Dressing Type Transparent 10/19/20 1604   Hub Color/Line Status Pink;Flushed 10/19/20 1604   Action Taken Open ports on tubing capped 10/19/20 1604   Alcohol Cap Used Yes 10/19/20 1604       Peripheral IV 10/17/20 Right Hand (Active)   Site Assessment Clean, dry, & intact 10/19/20 1604   Phlebitis Assessment 0 10/19/20 1604   Infiltration Assessment 0 10/19/20 1604   Dressing Status Clean, dry, & intact 10/19/20 1604   Dressing Type Transparent;Tape 10/19/20 1604   Hub Color/Line Status Pink; Infusing 10/19/20 1604   Action Taken Open ports on tubing capped 10/19/20 1604   Alcohol Cap Used Yes 10/19/20 1604        Opportunity for questions and clarification was provided.       Patient transported with:   [place]

## 2020-10-19 NOTE — DIABETES MGMT
Glycemic Control Plan of Care    T2DM with A1c of 6.7% (Myrna, 9/24/2020)  Home diabetes medications: Patient reported on 10/16:  Lantus insulin 10 units daily at bedtime    He was admitted on 10/15/2020 with GI bleed and noted report of EGD report of duodenal ulcer with clot oozing. Patient's history include CAD and CABG, ESRD on hemodialysis, chronic systolic CHF, HTN, and COPD. Recommendation(s): change bedtime lantus insulin 5 units from bedtime to daytime starting today because he did not get the lantus dose last night and his blood glucose is elevated. Order obtained. Glycemic assessment:    Seen patient this afternoon and discussed elevated blood glucose levels. Patient reported that he has been getting fruit juice with breakfast which is making his blood sugar go up. He will request fruits from now on instead of fruit juice. Also noted that his bedtime lantus dose was not given last night. POC BG 10/18: 213, , 191, 185  POC BG 10/19 at time of review: 194, 239  Lab FBG 10/19: 159        Current A1C: 6.7% (Myrna, 9/24/2020) which is equivalent to estimated average blood glucose of 146 mg/dL during the past 2-3 months    Current hospital diabetes medications:  Basal lantus insulin 5 units daily  Correctional lispro insulin ACHS.  Modified to very resistant dose    Total daily dose insulin requirement previous day: 10/18:  Lantus: Not given  Lispro: 10 units  TDD insulin: 10units    Diet: Cardiac regular; regular; nutr suppl: glucerna shake with all meals    Goals:  Blood glucose will be within target range of  mg/dL by 10/22/2020     Education:  ___  Refer to Diabetes Education Record             _X__  Education not indicated at this time    Ankita Veliz RN [facility]– [place]  Pager: [phone]

## 2020-10-19 NOTE — PROGRESS NOTES
[facility]  -  Progress Note      Patient: Mildred Causey MRN: [id number]  SSN: [id number]    YOB: 1950  Age: 65 y.o. Sex: male      Admit Date: 10/15/2020    Assessment:     -Acute symptomatic blood loss anemia with Hgb 7.6 today after transfusions yesterday.   -Acute upper GI bleed, EGD 10/14/20 revealed bleeding duodenal ulcer. On ASA and Plavix as outpatient for recent PCI. -CAD s/p CABG 1990s (LIMA to LAD, SVG to RCA, SVG to OM/PLV) with recent anterior wall  STEMI requiring PCI to LIMA to LAD at the anastomosis site when he was found of 100% thrombotic occlusion with JHONATHAN (2.25 x 16 mm) 9/23/2020 (known chronically occluded SVG to RCA, known 30% disease at anastomotic site of SVG to OM/PLV)  -Ischemic CMY with EF 25% by echo 10/1/2020 (15% 9/23/2020, 50% 7/2020). Plan for outpatient discussion of AICD, was not fitted for lifevest.  -ESRD recently started on HD following recent heart catheterization, possible cardiorenal syndrome versus/contrast induced SKYLER/ATN in the setting of diuresis. Patient reports this is hopefully temporary.  -Chronic HFrEF. -Chronic anemia. Known recent anemia with heme negative stool at [facility]. -Hypertension.  -Dyslipidemia. -Diabetes mellitus. -COPD. -Tobacco use. -Poor dentition. Recent extraction.     Primary cardiologist Dr. Yunier Rucker. Plan:     Plavix restarted. Monitor for now. Dispo per primary team if no recurrent bleeding. I saw, examined, and evaluated the patient. I personally reviewed the patient's labs, tests, vitals, orders, medications, updated history, and other providers assessments. I personally agree with the findings as stated and the plan as documented. Subjective:     No new complaints. Has restarted Plavix and so far no complaints.      Objective:      Patient Vitals for the past 8 hrs:   Temp Pulse Resp BP SpO2   10/19/20 0751 98 °F (36.7 °C) (!) 114 18 133/89 99 %   10/19/20 0431 97.7 °F (36.5 °C) (!) 101 18 (!) 140/65 97 %         Patient Vitals for the past 96 hrs:   Weight   10/19/20 0431 87.4 kg (192 lb 12 oz)   10/18/20 0607 87.5 kg (193 lb)   10/17/20 1349 87.1 kg (192 lb)   10/16/20 0452 88.1 kg (194 lb 3.2 oz)   10/15/20 1444 85.7 kg (189 lb)         Intake/Output Summary (Last 24 hours) at 10/19/2020 1120  Last data filed at 10/19/2020 0858  Gross per 24 hour   Intake 320 ml   Output 125 ml   Net 195 ml       Physical Exam:  General:  alert, cooperative, no distress, appears stated age  Neck:  nontender, no JVD  Lungs:  clear to auscultation bilaterally  Heart:  regular rate and rhythm, S1, S2 normal, no murmur, click, rub or gallop  Abdomen:  abdomen is soft without significant tenderness, masses, organomegaly or guarding  Extremities:  extremities normal, atraumatic, no cyanosis or edema    Data Review:     Labs: Results:       Chemistry Recent Labs     10/19/20  0518 10/17/20  0126   * 179*    141   K 3.4* 3.9    105   CO2 26 25   BUN 33* 32*   CREA 5.82* 6.09*   CA 8.1* 8.0*   MG 1.5* 1.7   PHOS 3.6  --    AGAP 11 11   BUCR 6* 5*      CBC w/Diff Recent Labs     10/19/20  0518 10/19/20  0050 10/18/20  1755  10/17/20  0126   WBC 10.3  --   --   --  11.0   RBC 2.62*  --   --   --  2.28*   HGB 8.0* 7.6* 8.0*   < > 7.1*   HCT 24.7* 23.0* 24.4*   < > 21.7*   *  --   --   --  131*   GRANS 80*  --   --   --  84*   LYMPH 10*  --   --   --  10*   EOS 0  --   --   --  0    < > = values in this interval not displayed. Cardiac Enzymes No results found for: CPK, CK, CKMMB, CKMB, RCK3, CKMBT, CKNDX, CKND1, SAYRA, TROPT, TROIQ, RAVEN, TROPT, TNIPOC, BNP, BNPP   Coagulation No results for input(s): PTP, INR, APTT, INREXT in the last 72 hours.     Lipid Panel Lab Results   Component Value Date/Time    Cholesterol, total 89 11/19/2013 11:33 AM    HDL Cholesterol 29 (L) 11/19/2013 11:33 AM    LDL, calculated 47.6 11/19/2013 11:33 AM    VLDL, calculated 12.4 11/19/2013 11:33 AM    Triglyceride 62 11/19/2013 11:33 AM    CHOL/HDL Ratio 3.1 11/19/2013 11:33 AM      BNP No results found for: BNP, BNPP, XBNPT   Liver Enzymes No results for input(s): TP, ALB, TBIL, AP in the last 72 hours.     No lab exists for component: SGOT, GPT, DBIL   Digoxin    Thyroid Studies No results found for: T4, T3U, TSH, TSHEXT

## 2020-10-19 NOTE — PROGRESS NOTES
Bedside shift change report given to Via Milton Gbibs (oncoming nurse) by Khushbu Matute RN (offgoing nurse). Report included the following information SBAR, ED Summary and MAR.

## 2020-10-19 NOTE — PROGRESS NOTES
Received to room 464 from CVT. Dx. GI bleed/Anemia. A/OX4. Denies pain. Appears to be in no resp. distress. Call bell phone within reach. 4 eye skin assessment complete with JUNE Ribera RN. Assessment complete.

## 2020-10-19 NOTE — PROGRESS NOTES
In Patient Progress note      Admit Date: 10/15/2020          Impression:     #1 Dialysis dependent SKYLER , Monday Wednesday Friday,  Appears to have recovered some renal functions , creatinine slightly better   Compared to few days back. Making more urine although not recorded.     #2 GI bleed due to duodenal ulcer  #3 Acute blood loss anemia  #4 Coronary artery disease s/p CABG  #5 Diabetes mellitus  #6 hypokalemia      Plan:  #1 appears to be recovering renal functions , will assess daily   Holding dialysis today , try to do strict I/o   #2 follow GI recs   #3 continue  JASS    #4 d/c IVF   #5 replace K       Holding HD   Please call with questions, chest congestion     Kylah Aldridge MD FASN  Cell [phone]  Pager: [phone]    Subjective:       Denies SOB/CP      Current Facility-Administered Medications:     insulin lispro (HUMALOG) injection, , SubCUTAneous, AC&HS, Jeanine Yu, DO, 4 Units at 10/19/20 1127    clopidogreL (PLAVIX) tablet 75 mg, 75 mg, Oral, DAILY, [place] MD, 75 mg at 10/19/20 3952    metoprolol tartrate (LOPRESSOR) tablet 25 mg, 25 mg, Oral, BID, [place] MD, 25 mg at 10/19/20 0827    metoprolol (LOPRESSOR) injection 1.25 mg, 1.25 mg, IntraVENous, Q4H PRN, Jeanine Yu DO    0.9% sodium chloride infusion 250 mL, 250 mL, IntraVENous, PRN, Tyrone Sandoval MD    heparin (porcine) 1,000 unit/mL injection 5,000 Units, 5,000 Units, InterCATHeter, DIALYSIS PRN, Conor Mosquera MD, 5,000 Units at 10/16/20 1415    sodium chloride (NS) flush 5-40 mL, 5-40 mL, IntraVENous, Q8H, Abram Baker MD, 10 mL at 10/19/20 1300    sodium chloride (NS) flush 5-40 mL, 5-40 mL, IntraVENous, PRN, Abram Baker MD, 10 mL at 10/15/20 0615    acetaminophen (TYLENOL) tablet 650 mg, 650 mg, Oral, Q6H PRN **OR** acetaminophen (TYLENOL) suppository 650 mg, 650 mg, Rectal, Q6H PRN, Abram Baker MD    polyethylene glycol (MIRALAX) packet 17 g, 17 g, Oral, DAILY PRN, Eleonora Keys MD    promethazine (PHENERGAN) tablet 12.5 mg, 12.5 mg, Oral, Q6H PRN **OR** ondansetron (ZOFRAN) injection 4 mg, 4 mg, IntraVENous, Q6H PRN, Abram Baker MD    pantoprazole (PROTONIX) 40 mg in 0.9% sodium chloride 10 mL injection, 40 mg, IntraVENous, Q12H, Abram Baker MD, 40 mg at 10/19/20 0829    glucose chewable tablet 16 g, 4 Tab, Oral, PRN, Abram Baker MD    glucagon (GLUCAGEN) injection 1 mg, 1 mg, IntraMUSCular, PRN, Abram Baker MD    dextrose (D50W) injection syrg 12.5-25 g, 25-50 mL, IntraVENous, PRN, Abram Baker MD    atorvastatin (LIPITOR) tablet 80 mg, 80 mg, Oral, QHS, Abram Baker MD, 80 mg at 10/18/20 2225    albuterol-ipratropium (DUO-NEB) 2.5 MG-0.5 MG/3 ML, 3 mL, Nebulization, Q4H PRN, Abram Baker MD    budesonide (PULMICORT) 500 mcg/2 ml nebulizer suspension, 500 mcg, Nebulization, BID RT, Abram Baker MD, 500 mcg at 10/18/20 1950    lactated Ringers infusion, 25 mL/hr, IntraVENous, CONTINUOUS, Toño Rogers Ala, MD, Last Rate: 25 mL/hr at 10/19/20 0903, 25 mL/hr at 10/19/20 0903    sucralfate (CARAFATE) tablet 1 g, 1 g, Oral, Q6H, Susanna Houston MD, 1 g at 10/19/20 1127    insulin glargine (LANTUS) injection 5 Units, 5 Units, SubCUTAneous, QHS, Roland Duran MD, 5 Units at 10/17/20 2202        Objective:     Visit Vitals  /76   Pulse (!) 104   Temp 98 °F (36.7 °C)   Resp 20   Ht 5' 9\" (1.753 m)   Wt 87.4 kg (192 lb 12 oz)   SpO2 100%   BMI 28.46 kg/m²         Intake/Output Summary (Last 24 hours) at 10/19/2020 1333  Last data filed at 10/19/2020 1226  Gross per 24 hour   Intake 460 ml   Output 125 ml   Net 335 ml       Physical Exam:      Sitting comfortably   Patient is in no apparent distress. HEENT: Head is normocephalic and atraumatic. Neck: no cervical lymphadenopathy or thyromegaly. Lungs: good air entry, clear to auscultation bilaterally.    Cardiovascular system: S1, S2, regular rate and rhythm. Abdomen: soft, non tender, non distended.   Extremities: no edema   Rt IJ TDC     Data Review:    Recent Labs     10/19/20  0518   WBC 10.3   RBC 2.62*   HCT 24.7*   MCV 94.3   MCH 30.5   MCHC 32.4   RDW 21.2*     Recent Labs     10/19/20  0518 10/17/20  0126   BUN 33* 32*   CREA 5.82* 6.09*   CA 8.1* 8.0*   K 3.4* 3.9    141    105   CO2 26 25   PHOS 3.6  --    * 179*       Prudence MD Henrry

## 2020-10-19 NOTE — PROGRESS NOTES
Nutrition Assessment     Type and Reason for Visit: Reassess, Positive nutrition screen    Nutrition Recommendations/Plan:  - Add supplements: Glucerna Shake, TID.  - Monitor and encourage po intake as tolerated. Update food preferences. - Cardiac diet education provided today per pt request.    Nutrition Assessment:  EGD 10/15 with actively bleeding duodenal ulcer & intervention. Full liquids 10/16 and advanced to solids yesterday. Pt with poor intake, stating he is \"taking it slow. \" Food preferences obtained and agreeable to supplements. Pt with questions about diet & cardiac education provided today. Per pt report, Nephrology does not anticipate he will need HD outpatient. Malnutrition Assessment:  Malnutrition Status: At risk for malnutrition (specify)(NPO)     Estimated Daily Nutrient Needs:  Energy (kcal):  3419-9126  Protein (g):         Fluid (ml/day):  5249-7357    Nutrition Related Findings:  Soft, bloody BM 10/16. HD 10/16 500 mL UF removed. K replaced      Current Nutrition Therapies:  DIET CARDIAC Regular    Anthropometric Measures:  · Height:  5' 9\" (175.3 cm)  · Current Body Wt:  87.1 kg (192 lb)  · BMI: 28.3    Nutrition Diagnosis:   · Inadequate energy intake related to altered GI function(decreased appetite) as evidenced by intake 0-25%(admitted with GI bleeding)    · Increased nutrient needs related to renal dysfunction as evidenced by dialysis    Nutrition Intervention:  Food and/or Nutrient Delivery: Continue current diet, Start oral nutrition supplement  Nutrition Education and Counseling: Education completed  Coordination of Nutrition Care: Continued inpatient monitoring    Goals:  PO nutrition intake will meet >75% of patient estimated nutritional needs within the next 7 days.        Nutrition Monitoring and Evaluation:   Behavioral-Environmental Outcomes: Knowledge or skill  Food/Nutrient Intake Outcomes: Food and nutrient intake, Supplement intake  Physical Signs/Symptoms Outcomes: Biochemical data, GI status, Meal time behavior, Nutrition focused physical findings    Discharge Planning:    Continue current diet, Continue oral nutrition supplement     Electronically signed by Sumaya Smith RD on 10/19/2020 at 11:02 AM    Contact Number: [phone]

## 2020-10-19 NOTE — PROGRESS NOTES
[facility]  Progress Note    Patient: Alia Issa Age: 65 y.o. : 1950 MR#: [id number] SSN: [id number]  Date/Time: 10/19/2020    Subjective:     Patient sitting in chair today, NAD  Denies any further bleeding from rectum  Denies any chest pain or shortness of breath    Assessment/Plan:     1. Acute blood loss anemia  2. GI bleed  3. Duodenal ulcer with active bleeding noted on EGD status post EGD with epi injection and BiCAP  4. Coronary artery disease, recent STEMI requiring PCI and stent  5. Ischemic cardiomyopathy EF of 25%  6. End-stage renal disease  7. Chronic systolic congestive heart failure  8. Hypertension  9. COPD without acute exacerbation    Cardiology and nephrology following, GI signed off  HD per nephrology  Cardiology to consider fitting patient with Rogelio Guzman prior to discharge  Status post EGD on 10/15 which showed a bleeding duodenal ulcer; follow-up H. pylori antibiotics  Plavix restarted today  We will need to monitor as inpatient for 48 hours after beginning Plavix  Continue metoprolol 25 mg twice daily  Continue PPI twice daily  Follow-up H&H, BMP  Strict I's and O's  PT, OTrecommending home with [facility]  Palliative care following      Case discussed with:  [x]Patient  []Family  [x]Nursing  [x]Case Management  DVT Prophylaxis:  []Lovenox  []Hep SQ  [x]SCDs  []Coumadin   []On Heparin gtt  Diet: Cardiac  CODE STATUS: Full  Disposition: Stable for transfer to telemetry, hopefully home on Wednesday if doing well    KA[facility]LYN  [facility], DO   2020       Objective:   VS:   Visit Vitals  /77   Pulse (!) 110   Temp 98.5 °F (36.9 °C)   Resp 20   Ht 5' 9\" (1.753 m)   Wt 87.4 kg (192 lb 12 oz)   SpO2 99%   BMI 28.46 kg/m²      Tmax/24hrs: Temp (24hrs), Av °F (36.7 °C), Min:97.7 °F (36.5 °C), Max:98.5 °F (36.9 °C)    Input/Output:     Intake/Output Summary (Last 24 hours) at 10/19/2020 8468  Last data filed at 10/19/2020 1226  Gross per 24 hour   Intake 360 ml   Output 125 ml   Net 235 ml       General:  Awake, alert  Cardiovascular:  S1S2+, RRR  Pulmonary: Decreased breath sounds at bilateral bases  GI:  Soft, BS+, NT, ND  Extremities:  trace edema        Labs:    Recent Results (from the past 24 hour(s))   HGB & HCT    Collection Time: 10/18/20  5:55 PM   Result Value Ref Range    HGB 8.0 (L) 13.0 - 16.0 g/dL    HCT 24.4 (L) 36.0 - 48.0 %   GLUCOSE, POC    Collection Time: 10/18/20 10:23 PM   Result Value Ref Range    Glucose (POC) 185 (H) 70 - 110 mg/dL   HGB & HCT    Collection Time: 10/19/20 12:50 AM   Result Value Ref Range    HGB 7.6 (L) 13.0 - 16.0 g/dL    HCT 23.0 (L) 36.0 - 67.6 %   METABOLIC PANEL, BASIC    Collection Time: 10/19/20  5:18 AM   Result Value Ref Range    Sodium 138 136 - 145 mmol/L    Potassium 3.4 (L) 3.5 - 5.5 mmol/L    Chloride 101 100 - 111 mmol/L    CO2 26 21 - 32 mmol/L    Anion gap 11 3.0 - 18 mmol/L    Glucose 169 (H) 74 - 99 mg/dL    BUN 33 (H) 7.0 - 18 MG/DL    Creatinine 5.82 (H) 0.6 - 1.3 MG/DL    BUN/Creatinine ratio 6 (L) 12 - 20      GFR est AA 12 (L) >60 ml/min/1.73m2    GFR est non-AA 10 (L) >60 ml/min/1.73m2    Calcium 8.1 (L) 8.5 - 10.1 MG/DL   CBC WITH AUTOMATED DIFF    Collection Time: 10/19/20  5:18 AM   Result Value Ref Range    WBC 10.3 4.6 - 13.2 K/uL    RBC 2.62 (L) 4.70 - 5.50 M/uL    HGB 8.0 (L) 13.0 - 16.0 g/dL    HCT 24.7 (L) 36.0 - 48.0 %    MCV 94.3 74.0 - 97.0 FL    MCH 30.5 24.0 - 34.0 PG    MCHC 32.4 31.0 - 37.0 g/dL    RDW 21.2 (H) 11.6 - 14.5 %    PLATELET 354 (L) 379 - 420 K/uL    MPV 9.1 (L) 9.2 - 11.8 FL    NEUTROPHILS 80 (H) 42 - 75 %    BAND NEUTROPHILS 1 0 - 5 %    LYMPHOCYTES 10 (L) 20 - 51 %    MONOCYTES 9 2 - 9 %    EOSINOPHILS 0 0 - 5 %    BASOPHILS 0 0 - 3 %    ABS. NEUTROPHILS 8.4 (H) 1.8 - 8.0 K/UL    ABS. LYMPHOCYTES 1.0 0.8 - 3.5 K/UL    ABS. MONOCYTES 0.9 0 - 1.0 K/UL    ABS. EOSINOPHILS 0.0 0.0 - 0.4 K/UL    ABS.  BASOPHILS 0.0 0.0 - 0.06 K/UL    DF MANUAL      PLATELET COMMENTS ADEQUATE PLATELETS      RBC COMMENTS ANISOCYTOSIS  1+        RBC COMMENTS HYPOCHROMIA  1+        RBC COMMENTS POLYCHROMASIA  1+        RBC COMMENTS POIKILOCYTOSIS  1+        RBC COMMENTS HAILEY CELLS  1+       MAGNESIUM    Collection Time: 10/19/20  5:18 AM   Result Value Ref Range    Magnesium 1.5 (L) 1.6 - 2.6 mg/dL   PHOSPHORUS    Collection Time: 10/19/20  5:18 AM   Result Value Ref Range    Phosphorus 3.6 2.5 - 4.9 MG/DL   IRON PROFILE    Collection Time: 10/19/20  5:18 AM   Result Value Ref Range    Iron 35 (L) 50 - 175 ug/dL    TIBC 210 (L) 250 - 450 ug/dL    Iron % saturation 17 (L) 20 - 50 %   GLUCOSE, POC    Collection Time: 10/19/20  8:01 AM   Result Value Ref Range    Glucose (POC) 194 (H) 70 - 110 mg/dL   GLUCOSE, POC    Collection Time: 10/19/20 11:22 AM   Result Value Ref Range    Glucose (POC) 239 (H) 70 - 110 mg/dL   GLUCOSE, POC    Collection Time: 10/19/20  4:29 PM   Result Value Ref Range    Glucose (POC) 186 (H) 70 - 110 mg/dL       Disclaimer: Sections of this note are dictated using utilizing voice recognition software, which may have resulted in some phonetic based errors in grammar and contents. Even though attempts were made to correct all the mistakes, some may have been missed, and remained in the body of the document. If questions arise, please contact our department.

## 2020-10-19 NOTE — CONSULTS
[facility]: [phone] ([phone])  [facility]: [phone]   [facility]: [phone]    Patient Name: Tanya Cho  YOB: 1950    Date of Initial Consult: 10/19/2020  Reason for Consult: establish goals of care  Requesting Provider: Courtney Centeno MD    Primary Care Physician: Alan Fernandez DO      SUMMARY:   Tanya Cho is a 65 y.o. male with a past history of COPD, DM 2, Dyslipidemia, HTN, Chronic anemia, ESRD, Ischemic CMY with EF 25%, CAD s/p CABG 1990's, who was admitted on 10/15/2020 from home with a diagnosis of acute symptomatic blood loss from upper GI Bleed. Current medical issues leading to Palliative Medicine involvement include: Establish goals of care    CHIEF COMPLAINT: weakness     HPI/SUBJECTIVE:    Pt is a fairly robust appearing CM that came in from home after feeling very fatigued and weak. He lives with his life partner of 40 years. Pt reports that he does not work but at baseline has been pretty independent able to walk with a rollator and do his own higher and lower functional ADL's. Pt had a recent STEMI with LIMA to LAD stent 2 weeks ago at [facility]. He is on dialysis 3X week for ESKD    The patient is:   [x] Verbal and participatory  [] Non-participatory due to:     GOALS OF CARE:  Patient/Health Care Proxy Stated Goals: Prolong life      TREATMENT PREFERENCES:   Code Status: Full Code         PALLIATIVE DIAGNOSES:   1. Goals of care/ACP  2. GI bleed  3. Heart failure  4. Debility       PLAN:   1. Goals of Care/ACP  Palliative team including this NP and Kya Warner RN met with patient at the bedside. Discussion of his recent decline and his concerns for increasing debility. Pt was willing to do an AMD naming his long term partner Merlene Kumar and sister in law [facility].  Pt discussed that he would not want CPR if his heart stopped and he were not breathing but did not want to fill out the DDNR to memorialize this. I informed him that I would keep him a full code until he has had time to discuss with his partner. He agreed to have us come back tomorrow. At this time patient is a FULL CODE with FULL INTERVENTIONS. 2.   GI Bleed  Seen by GI thought to be related to anticoagulation therapy post his STEMI two weeks ago. Pt with acute anemia related to blood loss with a Hgb of 7.6 post his transfusion yesterday. EGD done 10/14/20 showed a bleeding duodenal ulcer. 3.   Heart Failure  EF of 25% Ischemic CMY up from 15% on 9/23/2020. Pt with Chronic HFr EF. CAD s/p CABG 1990s (LIMA to LAD, SVG to RCA, SVG to OM/PLV) with recent anterior wall  STEMI requiring PCI to LIMA to LAD at the anastomosis site when he was found of 100% thrombotic occlusion with JHONATHAN (2.25 x 16 mm) 9/23/2020 (known chronically occluded SVG to RCA, known 30% disease at anastomotic site of SVG to OM/PLV)  4.   Debility  Reports use of rollator at home and independence with his ADL's at baseline. Increased weakness will likely have some setback seen by PT today who report that patient requires stand by asist for sit to stand but able to ambulate 20ft with stand by assist.   5.   Initial consult note routed to primary continuity provider  6. Communicated plan of care with: Palliative IDT      Advance Care Planning:  [] [facility] Interdisciplinary Team has updated the ACP Navigator with [address] and Patient Capacity    Primary Decision [facility] ([address]):   Primary Decision Maker: Thanh Garcia Partner - [phone]    Secondary Decision Maker: Isidra Leonardo (Sister-in-law) - Other Non-relative - [phone]    Medical Interventions: Full interventions   Other Instructions:         As far as possible, the palliative care team has discussed with patient / health care proxy about goals of care / treatment preferences for patient.          HISTORY:     History obtained from:   Principal Problem:    GIB (gastrointestinal bleeding) (10/15/2020)    Active Problems:    Dizziness (10/15/2020)      Anemia (10/15/2020)      Past Medical History:   Diagnosis Date    CAD (coronary artery disease)     Heart Attack 10/2020    Chronic kidney disease     Diabetes ([facility] [address]     Hypertension       Past Surgical History:   Procedure Laterality Date    CARDIAC SURG PROCEDURE UNLIST      Bypass x 4     HX ORTHOPAEDIC      Back     HX OTHER SURGICAL      Skin cancer       History reviewed. No pertinent family history. History reviewed, no pertinent family history.   Social History     Tobacco Use    Smoking status: Former Smoker     Last attempt to quit: 3/1/2020     Years since quittin.6    Smokeless tobacco: Never Used   Substance Use Topics    Alcohol use: Not Currently     Allergies   Allergen Reactions    Penicillins Hives and Itching      Current Facility-Administered Medications   Medication Dose Route Frequency    insulin glargine (LANTUS) injection 5 Units  5 Units SubCUTAneous DAILY    insulin lispro (HUMALOG) injection   SubCUTAneous AC&HS    clopidogreL (PLAVIX) tablet 75 mg  75 mg Oral DAILY    metoprolol tartrate (LOPRESSOR) tablet 25 mg  25 mg Oral BID    metoprolol (LOPRESSOR) injection 1.25 mg  1.25 mg IntraVENous Q4H PRN    0.9% sodium chloride infusion 250 mL  250 mL IntraVENous PRN    heparin (porcine) 1,000 unit/mL injection 5,000 Units  5,000 Units InterCATHeter DIALYSIS PRN    sodium chloride (NS) flush 5-40 mL  5-40 mL IntraVENous Q8H    sodium chloride (NS) flush 5-40 mL  5-40 mL IntraVENous PRN    acetaminophen (TYLENOL) tablet 650 mg  650 mg Oral Q6H PRN    Or    acetaminophen (TYLENOL) suppository 650 mg  650 mg Rectal Q6H PRN    polyethylene glycol (MIRALAX) packet 17 g  17 g Oral DAILY PRN    promethazine (PHENERGAN) tablet 12.5 mg  12.5 mg Oral Q6H PRN    Or    ondansetron (ZOFRAN) injection 4 mg  4 mg IntraVENous Q6H PRN    pantoprazole (PROTONIX) 40 mg in 0.9% sodium chloride 10 mL injection  40 mg IntraVENous Q12H    glucose chewable tablet 16 g  4 Tab Oral PRN    glucagon (GLUCAGEN) injection 1 mg  1 mg IntraMUSCular PRN    dextrose (D50W) injection syrg 12.5-25 g  25-50 mL IntraVENous PRN    atorvastatin (LIPITOR) tablet 80 mg  80 mg Oral QHS    albuterol-ipratropium (DUO-NEB) 2.5 MG-0.5 MG/3 ML  3 mL Nebulization Q4H PRN    budesonide (PULMICORT) 500 mcg/2 ml nebulizer suspension  500 mcg Nebulization BID RT    sucralfate (CARAFATE) tablet 1 g  1 g Oral Q6H          Clinical Pain Assessment (nonverbal scale for nonverbal patients): Clinical Pain Assessment  Severity: 0          Duration: for how long has pt been experiencing pain (e.g., 2 days, 1 month, years)  Frequency: how often pain is an issue (e.g., several times per day, once every few days, constant)     FUNCTIONAL ASSESSMENT:     Palliative Performance Scale (PPS):  PPS: 50    ECOG  ECOG Status : Ambulatory, but unable to carry out work activities     PSYCHOSOCIAL/SPIRITUAL SCREENING:      Any spiritual / Hinduism concerns:  [] Yes /  [x] No    Caregiver Burnout:  [] Yes /  [] No /  [x] No Caregiver Present      Anticipatory grief assessment:   [x] Normal  / [] Maladaptive        REVIEW OF SYSTEMS:     Positive and pertinent negative findings in ROS are noted above in HPI. The following systems were [x] reviewed / [] unable to be reviewed as noted in HPI  Other findings are noted below.       Constitutional: alert and conversive not in any apparent distress   Eyes: pupils equal, anicteric  ENMT: no nasal discharge, moist mucous membranes  Cardiovascular: regular rhythm, distal pulses intact  Respiratory: breathing not labored, symmetric  Gastrointestinal: soft non-tender, +bowel sounds  Musculoskeletal: no deformity, no tenderness to palpation  Skin: warm, dry, very pale   Neurologic: AA O X4  following commands, moving all extremities  Psychiatric: full affect, no hallucinations    Other:    Systems: constitutional, ears/nose/mouth/throat, respiratory, gastrointestinal, genitourinary, musculoskeletal, integumentary, neurologic, psychiatric, endocrine. Positive findings noted below. Modified ESAS Completed by: provider   Fatigue: 5 Drowsiness: 3   Depression: 0 Pain: 0   Anxiety: 0 Nausea: 0   Anorexia: 0 Dyspnea: 2     Constipation: No     Stool Occurrence(s): 1        PHYSICAL EXAM:     Wt Readings from Last 3 Encounters:   10/19/20 87.4 kg (192 lb 12 oz)   11/21/13 97.1 kg (214 lb)     Blood pressure 116/76, pulse (!) 104, temperature 98 °F (36.7 °C), resp. rate 20, height 5' 9\" (1.753 m), weight 87.4 kg (192 lb 12 oz), SpO2 100 %. Pain:  Pain Scale 1: Numeric (0 - 10)  Pain Intensity 1: 0                      LAB AND IMAGING FINDINGS:     Lab Results   Component Value Date/Time    WBC 10.3 10/19/2020 05:18 AM    HGB 8.0 (L) 10/19/2020 05:18 AM    PLATELET 268 (L) 01/71/3806 05:18 AM     Lab Results   Component Value Date/Time    Sodium 138 10/19/2020 05:18 AM    Potassium 3.4 (L) 10/19/2020 05:18 AM    Chloride 101 10/19/2020 05:18 AM    CO2 26 10/19/2020 05:18 AM    BUN 33 (H) 10/19/2020 05:18 AM    Creatinine 5.82 (H) 10/19/2020 05:18 AM    Calcium 8.1 (L) 10/19/2020 05:18 AM    Magnesium 1.5 (L) 10/19/2020 05:18 AM    Phosphorus 3.6 10/19/2020 05:18 AM      Lab Results   Component Value Date/Time    Alk.  phosphatase 42 (L) 10/16/2020 05:08 AM    Protein, total 5.1 (L) 10/16/2020 05:08 AM    Albumin 2.8 (L) 10/16/2020 05:08 AM    Globulin 2.3 10/16/2020 05:08 AM     Lab Results   Component Value Date/Time    INR 1.2 10/15/2020 02:30 AM    Prothrombin time 14.9 10/15/2020 02:30 AM    aPTT 25.1 10/15/2020 02:30 AM      Lab Results   Component Value Date/Time    Iron 35 (L) 10/19/2020 05:18 AM    TIBC 210 (L) 10/19/2020 05:18 AM    Iron % saturation 17 (L) 10/19/2020 05:18 AM      No results found for: PH, PCO2, PO2  No components found for: GLPOC   No results found for: CPK, CKMB           Total time: 50 minutes   Counseling / coordination time, spent as noted above: Time was spent in direct consultation with patient, family and medical team   > 50% counseling / coordination:     Prolonged service was provided for  []30 min   []75 min in face to face time in the presence of the patient, spent as noted above. Time Start:   Time End:   Note: this can only be billed with 49406 (initial) or 06757 (follow up). If multiple start / stop times, list each separately.

## 2020-10-19 NOTE — PROGRESS NOTES
[email or web address]  [phone]    Gastroenterology follow up-Progress note    Impression:  1. GI bleed s/p EGD 10/15/2020 with findings of duodenal ulcer with adherent clot oozing BRB, epi injection, Bicap applied, bleeding ceased. No specimen for biopsy taken. 2. Anemia, acute - stable H/H 8.0/24.7  3. S/p STEMI with LIMA to LAD stent 2 weeks ago at [facility]  4. Requiring anticoagulation therapy  5. Dialysis dependent (M, W, F)      Plan:  1. H. pylori ab ordered - treat if positive. 2. Replete iron if deficient (iron profile ordered). 3. Continue acid suppression twice daily and carafate as established. 4. Monitor for GI bleed with resumption of anticoagulation. May need repeat endoscopic surveillance and therapy if recurrent GI bleed. 5. No NSAIDs. If ASA therapy is needed, close monitoring for GI bleed is recommended. 6. Medical management otherwise per primary team.  7. Outpatient GI follow-up with Dr Jennifer Rosenberg ([address] [place] [address]) is recommended. Will sign off, but remain available as needed. Thank you for this consultation and the opportunity to participate in the care of this patient. Please do not hesitate to call with any questions or concerns. .          Subjective:  No GI events overnight. Tolerated solids and liquids. No BMs, abdominal pain, heartburn/acid reflux. ROS: Denies any fevers, chills, rash.      Eyes: conjunctiva normal, EOM normal   Neck: ROM normal, supple and trachea normal   Cardiovascular: normal rate and regular rhythm   Pulmonary/Chest Wall: breath sounds normal and effort normal   Abdominal: appearance normal, bowel sounds normal and soft, non-acute, non-tender     Patient Active Problem List   Diagnosis Code    Dizziness R42    Anemia D64.9    GIB (gastrointestinal bleeding) K92.2         Visit Vitals  /89   Pulse (!) 114   Temp 98 °F (36.7 °C)   Resp 18   Ht 5' 9\" (1.753 m)   Wt 87.4 kg (192 lb 12 oz)   SpO2 99%   BMI 28.46 kg/m²           Intake/Output Summary (Last 24 hours) at 10/19/2020 1028  Last data filed at 10/19/2020 0858  Gross per 24 hour   Intake 320 ml   Output 125 ml   Net 195 ml       CBC w/Diff    Lab Results   Component Value Date/Time    WBC 10.3 10/19/2020 05:18 AM    RBC 2.62 (L) 10/19/2020 05:18 AM    HGB 8.0 (L) 10/19/2020 05:18 AM    HCT 24.7 (L) 10/19/2020 05:18 AM    MCV 94.3 10/19/2020 05:18 AM    MCH 30.5 10/19/2020 05:18 AM    MCHC 32.4 10/19/2020 05:18 AM    RDW 21.2 (H) 10/19/2020 05:18 AM     (L) 10/19/2020 05:18 AM    Lab Results   Component Value Date/Time    GRANS PENDING 10/19/2020 05:18 AM    LYMPH PENDING 10/19/2020 05:18 AM    EOS PENDING 10/19/2020 05:18 AM    BASOS PENDING 10/19/2020 05:18 AM      Basic Metabolic Profile   Recent Labs     10/19/20  0518 10/17/20  0126    141   K 3.4* 3.9    105   CO2 26 25   BUN 33* 32*   CA 8.1* 8.0*   MG  --  1.7        Hepatic Function    Lab Results   Component Value Date/Time    ALB 2.8 (L) 10/16/2020 05:08 AM    TP 5.1 (L) 10/16/2020 05:08 AM    AP 42 (L) 10/16/2020 05:08 AM    No results found for: TBIL       Coags   No results for input(s): PTP, INR, APTT, INREXT in the last 72 hours. MANDI Mora    [facility]. [email or web address]  Phone: [phone]  Pager: [phone]

## 2020-10-19 NOTE — PROGRESS NOTES
Problem: Mobility Impaired (Adult and Pediatric)  Goal: *Acute Goals and Plan of Care (Insert Text)  Description: Physical Therapy Goals  Initiated 10/18/2020 and to be accomplished within 7 day(s)    1. Patient will transfer from bed to chair and chair to bed with modified independence using the least restrictive device. 2.  Patient will perform sit to stand with modified independence. 3.  Patient will ambulate with modified independence for 150 feet with the least restrictive device. To prepare pt for home mobility. PLOF:  Pt lives with his partner in a 1 story apartment with no stairs to enter. Prior to this hospitalization pt was ambulating in home and community with no AD but notes he has been becoming progressively weaker and fatigues more easily over the past year. Outcome: Progressing Towards Goal    PHYSICAL THERAPY TREATMENT    Patient: Zenon Rush (10 y.o. male)  Date: 10/19/2020  Diagnosis: GIB (gastrointestinal bleeding) [K92.2]  Anemia [D64.9]  Dizziness [R42]   GIB (gastrointestinal bleeding)  Procedure(s) (LRB):  ENDOSCOPY w/duodenal ulcer coagulation and epinephrine injection (N/A) 4 Days Post-Op  Precautions: Fall, Aspiration      ASSESSMENT:  Patient received sitting up in chair agreeable to PT session. Patient agreeable to ambulate inside room only, was up this morning to the bathroom with OT. He performs sit to stand to RW with stand by assistance. Pt ambulates 20 ft with CG progressing to stand by assistance. He returns to chair for short seated rest break. He completes second trial ambulating 20 ft with stand by assistance. He performs standing marching with CGA to challenge strength and balance. Pt completes seated there-ex per flow sheet with supervision. At end of session patient left with call bell within reach and needs addressed.      Progression toward goals:   [x]      Improving appropriately and progressing toward goals  []      Improving slowly and progressing toward goals  []      Not making progress toward goals and plan of care will be adjusted     PLAN:  Patient continues to benefit from skilled intervention to address the above impairments. Continue treatment per established plan of care. Discharge Recommendations:  Home Health with supervision  Further Equipment Recommendations for Discharge:  rolling walker and shower chair     SUBJECTIVE:   Patient stated I can't wait to go home.     OBJECTIVE DATA SUMMARY:   Critical Behavior:  Neurologic State: Alert  Orientation Level: Oriented X4  Cognition: Appropriate for age attention/concentration, Appropriate decision making, Follows commands  Safety/Judgement: Fall prevention, Home safety  Functional Mobility Training:        Transfers:  Sit to Stand: Stand-by assistance  Stand to Sit: Stand-by assistance              Balance:  Sitting: Intact  Standing: Impaired; With support  Standing - Static: Good  Standing - Dynamic : Fair(/fair (+))               Ambulation/Gait Training:  Distance (ft): 20 Feet (ft)(x2)  Assistive Device: Walker, rolling  Ambulation - Level of Assistance: Contact guard assistance;Stand-by assistance  Gait Abnormalities: Decreased step clearance            Therapeutic Exercises:         EXERCISE   Sets   Reps   Active Active Assist   Passive Self ROM   Comments   Seated HR/TR 1 15  [x] [] [] []    Quad Sets/Glut Sets    [] [] [] [] Hold for 5 secs   Hamstring Sets   [] [] [] []    Short Arc Quads   [] [] [] []    Heel Slides   [] [] [] []    Straight Leg Raises   [] [] [] []    Hip Add   [] [] [] [] Hold for 5 secs, w/ pillow squeeze   Long Arc Quads 1 10 [x] [] [] [] 3 second hold   Seated Marching 1 10 [x] [] [] []    Standing Marching 1 10 [x] [] [] []       [] [] [] []        Pain:  Pain level pre-treatment: 0/10  Pain level post-treatment: 0/10   Pain Intervention(s): Medication (see MAR);  Rest, Ice, Repositioning   Response to intervention: Nurse notified, See doc flow    Activity Tolerance:   Fair/good  Please refer to the flowsheet for vital signs taken during this treatment. After treatment:   [x] Patient left in no apparent distress sitting up in chair  [] Patient left in no apparent distress in bed  [x] Call bell left within reach  [] Nursing notified  [] Caregiver present  [] Bed alarm activated  [] SCDs applied      COMMUNICATION/EDUCATION:   [x]         Role of Physical Therapy in the acute care setting. [x]         Fall prevention education was provided and the patient/caregiver indicated understanding. [x]         Patient/family have participated as able in working toward goals and plan of care. []         Patient/family agree to work toward stated goals and plan of care. []         Patient understands intent and goals of therapy, but is neutral about his/her participation.   []         Patient is unable to participate in stated goals/plan of care: ongoing with therapy staff.  []         Other:        Joellen Valdivia, PT   Time Calculation: 24 mins

## 2020-10-19 NOTE — ACP (ADVANCE CARE PLANNING)
Palliative Medicine    Pt completed an Advance Medical Directive today. One copy was placed on the chart for scanning into EMR. Three additional copies were given to the pt. Primary Decision Maker (Health Care Agent): Crystal Ozuna  Relationship to patient: Life partner   Phone number: [phone]  [x] Named in a scanned document   [] Legal Next of Kin  [] Guardian    Secondary Decision Maker ([address]): Denman Dakins L. Flora   Relationship to patient: Sister-in-law  Phone number: [phone]  [x] Named in a scanned document   [] Legal Next of Kin  [] Guardian    ACP documents you currently have include:  [x] Advance Directive or Living Will  [] Durable Do Not Resuscitate  [] Physician Orders for Scope of Treatment (POST)  [] Medical Power of   [] Other    Palliative Medicine team Dinora Snell NP and Yunier Fernandez RN met at pt's bedside. Pt was alert and oriented x 4. Pt stated, \"I just can't seem to stay out of the hospital.  I was in terrible shape on Wednesday. I have never had a fainting episode before\". Discussed benefits and burdens of CPR and intubation. Pt stated, \"I want you to do everything you can to keep me alive but after 30 days if it doesn't work then pull the plug\". Pt indicated that after he was dead that he would probably not want to be subjected to CPR but wanted to speak with his life partner of 40 years Maynor Bullock before making any final decisions. Pt understands in the absence of paperwork that he will remain a FULL code. Would like the opportunity to speak with Maynor Bullock before making any additional decisions. Agreeable to AMD completion today naming his life partner Maynor Bullock as primary medical POA and his sister-in-law Denman Dakins as his secondary mPOA. Pt remains FULL code with FULL aggressive medical management. Will follow up with pt for further code discussion status. Potential dispo plan is home with his partner Maynor Bullokc when pt is medically stable.      Thank you for the Palliative Medicine consult and allowing us to participate in the care of Mr. Dov Clarke. Will continue to monitor and provide support.     Fan Peter RN, BSN  Palliative Medicine Inpatient RN  [facility]  Palliative COPE Line: [phone] ([phone])

## 2020-10-19 NOTE — PROGRESS NOTES
Problem: Self Care Deficits Care Plan (Adult)  Goal: *Acute Goals and Plan of Care (Insert Text)  Description: Occupational Therapy Goals  Initiated 10/19/2020 within 7 day(s). 1.  Patient will perform upper body dressing with independence. 2.  Patient will perform lower body dressing with independence. 3.  Patient will perform functional activities in standing with G balance Independently. 4.  Patient will perform toilet transfers with independence. 5.  Patient will perform all aspects of toileting with independence. 6.  Patient will participate in upper extremity therapeutic exercise/activities with independence for 7 minutes. 7.  Patient will utilize energy conservation techniques during functional activities with verbal cues. Prior Level of Function:  Pt was previously independent with all ADLs without the use of AE/DME. Outcome: Progressing Towards Goal     OCCUPATIONAL THERAPY EVALUATION    Patient: Fei Faith (57 y.o. male)  Date: 10/19/2020  Primary Diagnosis: GIB (gastrointestinal bleeding) [K92.2]  Anemia [D64.9]  Dizziness [R42]  Procedure(s) (LRB):  ENDOSCOPY w/duodenal ulcer coagulation and epinephrine injection (N/A) 4 Days Post-Op   Precautions:  Fall, Aspiration    ASSESSMENT :  Patient cleared to participate in occupational therapy evaluation by RN. Upon entering room patient received seated in bed with HOB raised watching television, alert and agreeable to participate in occupational therapy evaluation. RN briefly present this session to administer patient medication. Patient pleasantly talkative this session, reporting some increased fatigue 2/2 recent blood loss and time in the hospital. Patient modified independent - supervision during bed mobility to sit EOB in preparation for self-care this session. Patient stand-by assistance during all functional transfers and functional mobility using RW.  While standing at sink, patient independent to wash face, however, observed to fatigue after ~4 minutes. Patient requested to finish washing up seated in chair. Patient required minimum assistance to bathe upper body this session 2/2 fatigue and SOB. Patient educated on the benefits of pursed lip breathing and activity pacing for energy conservation. Patient also provided handout of energy conservation techniques to utilize during ADLs. Patient verbalized understanding of various techniques and expressed appreciation for handout. Patient left seated in chair with all needs met, call bell and phone within reach. Based on the objective data described below patient presents with decreased endurance, decreased independence and decreased functional mobility. Patient will benefit from skilled intervention to address the above impairments. Patient's rehabilitation potential is considered to be Excellent  Factors which may influence rehabilitation potential include:   []             None noted  []             Mental ability/status  [x]             Medical condition  []             Home/family situation and support systems  []             Safety awareness  []             Pain tolerance/management  []             Other:      PLAN :  Recommendations and Planned Interventions:   [x]               Self Care Training                  [x]      Therapeutic Activities  [x]               Functional Mobility Training   []      Cognitive Retraining  [x]               Therapeutic Exercises           [x]      Endurance Activities  []               Balance Training                    []      Neuromuscular Re-Education  []               Visual/Perceptual Training     [x]      Home Safety Training  [x]               Patient Education                   [x]      Family Training/Education  []               Other (comment):    Frequency/Duration: Patient will be followed by occupational therapy 1-2 times per day/4-7 days per week to address goals.   Discharge Recommendations: Home Health Safety Evaluation and increased supervision from partner. Further Equipment Recommendations for Discharge: shower chair     SUBJECTIVE:   Patient stated I just feel tired, I want to work on it though.     OBJECTIVE DATA SUMMARY:     Past Medical History:   Diagnosis Date    CAD (coronary artery disease)     Heart Attack 10/2020    Chronic kidney disease     Diabetes ([address]     Hypertension      Past Surgical History:   Procedure Laterality Date    CARDIAC SURG PROCEDURE UNLIST      Bypass x 4 1996    HX ORTHOPAEDIC      Back 1996    HX OTHER SURGICAL      Skin cancer 1996     Barriers to Learning/Limitations: None  Compensate with: visual, verbal, tactile, kinesthetic cues/model    Home Situation:   Home Situation  Home Environment: Apartment  # Steps to Enter: 0  One/Two Story Residence: One story  Living Alone: No  Support Systems: Spouse/Significant Other/Partner  Patient Expects to be Discharged to[de-identified] Apartment  Current DME Used/Available at Home: Felicity Rahul, rollator  Tub or Shower Type: Tub/Shower combination  [x]  Right hand dominant   []  Left hand dominant    Cognitive/Behavioral Status:  Neurologic State: Alert  Orientation Level: Oriented X4  Cognition: Appropriate for age attention/concentration; Appropriate decision making; Follows commands  Safety/Judgement: Fall prevention;Home safety    Skin: Bruising on BUE  Edema: None noted    Vision/Perceptual:      Acuity: Within Defined Limits    Corrective Lenses: Glasses    Coordination: BUE     Fine Motor Skills-Upper: Left Intact; Right Intact    Gross Motor Skills-Upper: Left Intact; Right Intact    Balance:  Sitting: Intact  Standing: Impaired  Standing - Static: Good  Standing - Dynamic : Fair    Strength: BUE    Strength: Generally decreased, functional      Tone & Sensation: BUE    Tone: Normal  Sensation: Intact       Range of Motion: BUE    AROM: Within functional limits      Functional Mobility and Transfers for ADLs:  Bed Mobility:     Supine to Sit: Modified independent; Additional time     Scooting: Supervision  Transfers:  Sit to Stand: Stand-by assistance  Stand to Sit: Stand-by assistance     Bed to Chair: Stand-by assistance      ADL Assessment:   Feeding: Independent    Oral Facial Hygiene/Grooming: Independent    Bathing: Minimum assistance    Upper Body Dressing: Stand-by assistance    Lower Body Dressing: Stand-by assistance    Toileting: Stand by assistance      ADL Intervention:  Feeding  Feeding Assistance: Independent  Drink to Mouth: Independent    Grooming  Grooming Assistance: Independent  Position Performed: Standing  Washing Face: Independent    Upper Body Bathing Assistance:  Bathing Assistance: Minimum Assistance    Upper Body Dressing Assistance  Dressing Assistance: Stand-by assistance  Hospital Gown: Stand-by assistance    Lower Body Dressing Assistance  Dressing Assistance: Stand-by assistance  Socks: Stand-by assistance  Leg Crossed Method Used: No  Position Performed: Bending forward method       Cognitive Retraining  Safety/Judgement: Fall prevention;Home safety      Pain:  Pain level pre-treatment: 0/10   Pain level post-treatment: -/10   Pain Intervention(s): Medication (see MAR)  Response to intervention: Nurse notified, See doc flow    Activity Tolerance:   Fair(+)    Please refer to the flowsheet for vital signs taken during this treatment. After treatment:   [x] Patient left in no apparent distress sitting up in chair  [] Patient left in no apparent distress in bed  [x] Call bell left within reach  [x] Nursing notified  [] Caregiver present  [] Bed alarm activated    COMMUNICATION/EDUCATION:   [x] Role of Occupational Therapy in the acute care setting  [x] Home safety education was provided and the patient/caregiver indicated understanding. [x] Patient/family have participated as able in goal setting and plan of care. [x] Patient/family agree to work toward stated goals and plan of care.   [] Patient understands intent and goals of therapy, but is neutral about his/her participation. [] Patient is unable to participate in goal setting and plan of care. Thank you for this referral.  Scarlet Ing  Time Calculation: 39 mins     A student participated in this treatment session. Per CMS Medicare statements and guidelines I certify that the following was true:     1. I was present and directly observed the entire session. 2. I made all skilled judgments and clinical decisions regarding care. 3. I am the practitioner responsible for assessment, treatment, and documentation. Thank you,  Nba Reis MS, OTR/L    Eval Complexity: History: MEDIUM Complexity : Expanded review of history including physical, cognitive and psychosocial  history ; Examination: LOW Complexity : 1-3 performance deficits relating to physical, cognitive , or psychosocial skils that result in activity limitations and / or participation restrictions ;    Decision Making:LOW Complexity : No comorbidities that affect functional and no verbal or physical assistance needed to complete eval tasks

## 2020-10-20 VITALS
BODY MASS INDEX: 28.55 KG/M2 | SYSTOLIC BLOOD PRESSURE: 103 MMHG | HEIGHT: 69 IN | DIASTOLIC BLOOD PRESSURE: 60 MMHG | RESPIRATION RATE: 18 BRPM | TEMPERATURE: 99 F | WEIGHT: 192.75 LBS | OXYGEN SATURATION: 96 % | HEART RATE: 106 BPM

## 2020-10-20 LAB
ALBUMIN SERPL-MCNC: 3.2 G/DL (ref 3.4–5)
ANION GAP SERPL CALC-SCNC: 9 MMOL/L (ref 3–18)
BUN SERPL-MCNC: 28 MG/DL (ref 7–18)
BUN/CREAT SERPL: 6 (ref 12–20)
CALCIUM SERPL-MCNC: 8.2 MG/DL (ref 8.5–10.1)
CHLORIDE SERPL-SCNC: 102 MMOL/L (ref 100–111)
CO2 SERPL-SCNC: 26 MMOL/L (ref 21–32)
CREAT SERPL-MCNC: 4.54 MG/DL (ref 0.6–1.3)
GLUCOSE BLD STRIP.AUTO-MCNC: 128 MG/DL (ref 70–110)
GLUCOSE BLD STRIP.AUTO-MCNC: 170 MG/DL (ref 70–110)
GLUCOSE BLD STRIP.AUTO-MCNC: 317 MG/DL (ref 70–110)
GLUCOSE SERPL-MCNC: 289 MG/DL (ref 74–99)
H PYLORI IGA SER-ACNC: <9 UNITS (ref 0–8.9)
H PYLORI IGG SER IA-ACNC: 2.6 INDEX VALUE (ref 0–0.79)
H PYLORI IGM SER-ACNC: <9 UNITS (ref 0–8.9)
HCT VFR BLD AUTO: 24.2 % (ref 36–48)
HCT VFR BLD AUTO: 26 % (ref 36–48)
HGB BLD-MCNC: 8.1 G/DL (ref 13–16)
HGB BLD-MCNC: 8.6 G/DL (ref 13–16)
PHOSPHATE SERPL-MCNC: 3.2 MG/DL (ref 2.5–4.9)
POTASSIUM SERPL-SCNC: 3.6 MMOL/L (ref 3.5–5.5)
SODIUM SERPL-SCNC: 137 MMOL/L (ref 136–145)

## 2020-10-20 PROCEDURE — 74011636637 HC RX REV CODE- 636/637: Performed by: FAMILY MEDICINE

## 2020-10-20 PROCEDURE — 99232 SBSQ HOSP IP/OBS MODERATE 35: CPT | Performed by: INTERNAL MEDICINE

## 2020-10-20 PROCEDURE — 97530 THERAPEUTIC ACTIVITIES: CPT

## 2020-10-20 PROCEDURE — 84100 ASSAY OF PHOSPHORUS: CPT

## 2020-10-20 PROCEDURE — 82040 ASSAY OF SERUM ALBUMIN: CPT

## 2020-10-20 PROCEDURE — 97110 THERAPEUTIC EXERCISES: CPT

## 2020-10-20 PROCEDURE — 97116 GAIT TRAINING THERAPY: CPT

## 2020-10-20 PROCEDURE — 74011000250 HC RX REV CODE- 250: Performed by: INTERNAL MEDICINE

## 2020-10-20 PROCEDURE — 05PYX3Z REMOVAL OF INFUSION DEVICE FROM UPPER VEIN, EXTERNAL APPROACH: ICD-10-PCS | Performed by: SURGERY

## 2020-10-20 PROCEDURE — 80048 BASIC METABOLIC PNL TOTAL CA: CPT

## 2020-10-20 PROCEDURE — 97535 SELF CARE MNGMENT TRAINING: CPT

## 2020-10-20 PROCEDURE — 74011250636 HC RX REV CODE- 250/636: Performed by: INTERNAL MEDICINE

## 2020-10-20 PROCEDURE — 82962 GLUCOSE BLOOD TEST: CPT

## 2020-10-20 PROCEDURE — C9113 INJ PANTOPRAZOLE SODIUM, VIA: HCPCS | Performed by: INTERNAL MEDICINE

## 2020-10-20 PROCEDURE — 74011250637 HC RX REV CODE- 250/637: Performed by: INTERNAL MEDICINE

## 2020-10-20 PROCEDURE — 2709999900 HC NON-CHARGEABLE SUPPLY

## 2020-10-20 PROCEDURE — 74011000258 HC RX REV CODE- 258: Performed by: PHYSICIAN ASSISTANT

## 2020-10-20 PROCEDURE — 99239 HOSP IP/OBS DSCHRG MGMT >30: CPT | Performed by: INTERNAL MEDICINE

## 2020-10-20 PROCEDURE — 85018 HEMOGLOBIN: CPT

## 2020-10-20 PROCEDURE — 74011250636 HC RX REV CODE- 250/636: Performed by: PHYSICIAN ASSISTANT

## 2020-10-20 PROCEDURE — 36415 COLL VENOUS BLD VENIPUNCTURE: CPT

## 2020-10-20 RX ORDER — SUCRALFATE 1 G/1
1 TABLET ORAL DAILY
Qty: 10 TAB | Refills: 0 | Status: SHIPPED | OUTPATIENT
Start: 2020-10-20

## 2020-10-20 RX ORDER — LIDOCAINE HYDROCHLORIDE 10 MG/ML
1-30 INJECTION, SOLUTION EPIDURAL; INFILTRATION; INTRACAUDAL; PERINEURAL ONCE
Status: DISCONTINUED | OUTPATIENT
Start: 2020-10-20 | End: 2020-10-20 | Stop reason: HOSPADM

## 2020-10-20 RX ORDER — PANTOPRAZOLE SODIUM 40 MG/1
40 TABLET, DELAYED RELEASE ORAL
Qty: 60 TAB | Refills: 1 | Status: SHIPPED | OUTPATIENT
Start: 2020-10-20

## 2020-10-20 RX ORDER — PANTOPRAZOLE SODIUM 40 MG/1
40 TABLET, DELAYED RELEASE ORAL
Status: DISCONTINUED | OUTPATIENT
Start: 2020-10-20 | End: 2020-10-20 | Stop reason: HOSPADM

## 2020-10-20 RX ADMIN — INSULIN LISPRO 12 UNITS: 100 INJECTION, SOLUTION INTRAVENOUS; SUBCUTANEOUS at 12:17

## 2020-10-20 RX ADMIN — SUCRALFATE 1 G: 1 TABLET ORAL at 13:59

## 2020-10-20 RX ADMIN — INSULIN GLARGINE 5 UNITS: 100 INJECTION, SOLUTION SUBCUTANEOUS at 10:08

## 2020-10-20 RX ADMIN — INSULIN LISPRO 3 UNITS: 100 INJECTION, SOLUTION INTRAVENOUS; SUBCUTANEOUS at 10:07

## 2020-10-20 RX ADMIN — METOPROLOL TARTRATE 25 MG: 25 TABLET, FILM COATED ORAL at 10:08

## 2020-10-20 RX ADMIN — SODIUM CHLORIDE 40 MG: 9 INJECTION INTRAMUSCULAR; INTRAVENOUS; SUBCUTANEOUS at 10:08

## 2020-10-20 RX ADMIN — SUCRALFATE 1 G: 1 TABLET ORAL at 06:07

## 2020-10-20 RX ADMIN — BUDESONIDE 500 MCG: 0.5 INHALANT RESPIRATORY (INHALATION) at 10:08

## 2020-10-20 RX ADMIN — CLOPIDOGREL BISULFATE 75 MG: 75 TABLET ORAL at 10:08

## 2020-10-20 RX ADMIN — EPOETIN ALFA-EPBX 10000 UNITS: 10000 INJECTION, SOLUTION INTRAVENOUS; SUBCUTANEOUS at 14:18

## 2020-10-20 RX ADMIN — SODIUM CHLORIDE 5 ML: 9 INJECTION, SOLUTION INTRAMUSCULAR; INTRAVENOUS; SUBCUTANEOUS at 13:59

## 2020-10-20 RX ADMIN — SODIUM CHLORIDE 200 MG: 900 INJECTION, SOLUTION INTRAVENOUS at 12:17

## 2020-10-20 NOTE — PROGRESS NOTES
PT/OT recommending home health. Met with pt but he declined. Per pt, his partner will pick him up when discharged. No needs identified at this time. Medicare pt has received, reviewed, and signed 2nd IM letter informing them of their right to appeal the discharge. Signed copy has been placed on pt bedside chart.       RADHA Wade RN  Care Management  Pager: [phone]

## 2020-10-20 NOTE — PROGRESS NOTES
Discussed with Vascular surgery , Dr Jorgito Poole will d/c the [address] to be d/c ed with Nephro follow up with Dr Markel Causey at [facility]     Please call with questions    Lupe Lin MD [facility]  Cell [phone]  Pager: [phone]

## 2020-10-20 NOTE — PROGRESS NOTES
Seen today. No bleeding. Feels well. Had EGD with treatment of bleeding DU. On plavix now. PE:   Visit Vitals  BP 94/62 (BP 1 Location: Left arm, BP Patient Position: At rest)   Pulse (!) 104   Temp 98.3 °F (36.8 °C)   Resp 20   Ht 5' 9\" (1.753 m)   Wt 87.4 kg (192 lb 12 oz)   SpO2 100%   BMI 28.46 kg/m²     Abdomen soft BS present  Neuro non focal exam    Recent Results (from the past 12 hour(s))   GLUCOSE, POC    Collection Time: 10/20/20  7:51 AM   Result Value Ref Range    Glucose (POC) 170 (H) 70 - 110 mg/dL   HGB & HCT    Collection Time: 10/20/20 10:10 AM   Result Value Ref Range    HGB 8.6 (L) 13.0 - 16.0 g/dL    HCT 26.0 (L) 36.0 - 83.0 %   METABOLIC PANEL, BASIC    Collection Time: 10/20/20 10:10 AM   Result Value Ref Range    Sodium 137 136 - 145 mmol/L    Potassium 3.6 3.5 - 5.5 mmol/L    Chloride 102 100 - 111 mmol/L    CO2 26 21 - 32 mmol/L    Anion gap 9 3.0 - 18 mmol/L    Glucose 289 (H) 74 - 99 mg/dL    BUN 28 (H) 7.0 - 18 MG/DL    Creatinine 4.54 (H) 0.6 - 1.3 MG/DL    BUN/Creatinine ratio 6 (L) 12 - 20      GFR est AA 16 (L) >60 ml/min/1.73m2    GFR est non-AA 13 (L) >60 ml/min/1.73m2    Calcium 8.2 (L) 8.5 - 10.1 MG/DL   ALBUMIN    Collection Time: 10/20/20 10:10 AM   Result Value Ref Range    Albumin 3.2 (L) 3.4 - 5.0 g/dL   PHOSPHORUS    Collection Time: 10/20/20 10:10 AM   Result Value Ref Range    Phosphorus 3.2 2.5 - 4.9 MG/DL   GLUCOSE, POC    Collection Time: 10/20/20 11:31 AM   Result Value Ref Range    Glucose (POC) 317 (H) 70 - 110 mg/dL     A/P: Recent MI. DU with bleeding- restarted on Plavix. Anemia multifactorial. ESRD on HD. Continue BID PPI. If continuous blood draws are done patient will need blood transfusion. Regular diet. Will sign off. Call us as needed.     Mei Laguerre MD

## 2020-10-20 NOTE — PROGRESS NOTES
In Patient Progress note      Admit Date: 10/15/2020    Impression:     #1 Dialysis dependent SKYLER , Monday Wednesday Friday,  Appears to have recovered , creatinine better , last HD was Friday   Making more urine although not recorded.   Needs TDC out , no need for dialysis  #2 GI bleed due to duodenal ulcer  #3 Acute blood loss anemia  #4 Coronary artery disease s/p CABG  #5 Diabetes mellitus  #6 hypokalemia      Plan:  #1 appears to be recovered renal functions , no need for RRT  I have consulted vascular surgery to d/c the [facility]   #2 follow GI recs   #3 continue  JASS    #4 local measures for edema     D/C TDC per vascular surgery   No need for HD      Hi Verdugo MD FASN  Cell [phone]  Pager: [phone]    Subjective:       Denies SOB/CP      Current Facility-Administered Medications:     iron sucrose (VENOFER) 200 mg in 0.9% sodium chloride 100 mL IVPB, 200 mg, IntraVENous, ONCE, Adalgisa Rodriguez PA    pantoprazole (PROTONIX) tablet 40 mg, 40 mg, Oral, ACB&D, Sumit Knox MD    insulin glargine (LANTUS) injection 5 Units, 5 Units, SubCUTAneous, DAILY, Claudette Rush DO, 5 Units at 10/20/20 1008    insulin lispro (HUMALOG) injection, , SubCUTAneous, AC&HS, Claudette Rush DO, 3 Units at 10/20/20 1007    clopidogreL (PLAVIX) tablet 75 mg, 75 mg, Oral, DAILY, Zully Ralph MD, 75 mg at 10/20/20 1008    metoprolol tartrate (LOPRESSOR) tablet 25 mg, 25 mg, Oral, BID, Zully Ralph MD, 25 mg at 10/20/20 1008    metoprolol (LOPRESSOR) injection 1.25 mg, 1.25 mg, IntraVENous, Q4H PRN, Claudette Rush DO    0.9% sodium chloride infusion 250 mL, 250 mL, IntraVENous, PRN, Tish Dodge MD    heparin (porcine) 1,000 unit/mL injection 5,000 Units, 5,000 Units, InterCATHeter, DIALYSIS PRN, Suhail, Ric Nolasco MD, 5,000 Units at 10/16/20 1415    sodium chloride (NS) flush 5-40 mL, 5-40 mL, IntraVENous, Q8H, Abram Baker MD, 10 mL at 10/19/20 2224    sodium chloride (NS) flush 5-40 mL, 5-40 mL, IntraVENous, PRN, Abram Baker MD, 10 mL at 10/15/20 0615    acetaminophen (TYLENOL) tablet 650 mg, 650 mg, Oral, Q6H PRN **OR** acetaminophen (TYLENOL) suppository 650 mg, 650 mg, Rectal, Q6H PRN, Abram Baker MD    polyethylene glycol (MIRALAX) packet 17 g, 17 g, Oral, DAILY PRN, Abram Baker MD    promethazine (PHENERGAN) tablet 12.5 mg, 12.5 mg, Oral, Q6H PRN **OR** ondansetron (ZOFRAN) injection 4 mg, 4 mg, IntraVENous, Q6H PRN, Abram Baker MD    glucose chewable tablet 16 g, 4 Tab, Oral, PRN, Abram Baker MD    glucagon (GLUCAGEN) injection 1 mg, 1 mg, IntraMUSCular, PRN, Abram Baker MD    dextrose (D50W) injection syrg 12.5-25 g, 25-50 mL, IntraVENous, PRN, Abram Baker MD    atorvastatin (LIPITOR) tablet 80 mg, 80 mg, Oral, QHS, Abram Baker MD, 80 mg at 10/19/20 2223    albuterol-ipratropium (DUO-NEB) 2.5 MG-0.5 MG/3 ML, 3 mL, Nebulization, Q4H PRN, Abram Baker MD    budesonide (PULMICORT) 500 mcg/2 ml nebulizer suspension, 500 mcg, Nebulization, BID RT, Abram Baker MD, 500 mcg at 10/20/20 1008    sucralfate (CARAFATE) tablet 1 g, 1 g, Oral, Q6H, Karlie Cueva MD, 1 g at 10/20/20 0607        Objective:     Visit Vitals  /76 (BP 1 Location: Left arm, BP Patient Position: At rest)   Pulse (!) 109   Temp 98.9 °F (37.2 °C)   Resp 19   Ht 5' 9\" (1.753 m)   Wt 87.4 kg (192 lb 12 oz)   SpO2 99%   BMI 28.46 kg/m²         Intake/Output Summary (Last 24 hours) at 10/20/2020 1127  Last data filed at 10/20/2020 0956  Gross per 24 hour   Intake 520 ml   Output 450 ml   Net 70 ml       Physical Exam:      Sitting comfortably   Patient is in no apparent distress. HEENT: Head is normocephalic and atraumatic. Neck: no cervical lymphadenopathy or thyromegaly. Lungs: good air entry, clear to auscultation bilaterally. Cardiovascular system: S1, S2, regular rate and rhythm.   Abdomen: soft, non tender, non distended. Extremities: 1+LE  edema   Rt IJ TDC     Data Review:    Recent Labs     10/20/20  1010  10/19/20  0518   WBC  --   --  10.3   RBC  --   --  2.62*   HCT 26.0*   < > 24.7*   MCV  --   --  94.3   MCH  --   --  30.5   MCHC  --   --  32.4   RDW  --   --  21.2*    < > = values in this interval not displayed.      Recent Labs     10/20/20  1010 10/19/20  0518   BUN 28* 33*   CREA 4.54* 5.82*   CA 8.2* 8.1*   ALB 3.2*  --    K 3.6 3.4*    138    101   CO2 26 26   PHOS 3.2 3.6   * 169*       Tyson Willis MD

## 2020-10-20 NOTE — PROGRESS NOTES
Pt seen and examined. Asked to removed TDC by Dr. Kayla Hyatt. Sutures removed. TDC pulled without incident. Manual pressure held for hemostasis.

## 2020-10-20 NOTE — DISCHARGE INSTRUCTIONS
Patient armband removed and shredded    Patient Education        Gastrointestinal Bleeding: Care Instructions  Your Care Instructions     The digestive or gastrointestinal tract goes from the mouth to the anus. It is often called the GI tract. Bleeding can happen anywhere in the GI tract. It may be caused by an ulcer, an infection, or cancer. It may also be caused by medicines such as aspirin or ibuprofen. Light bleeding may not cause any symptoms at first. But if you continue to bleed for a while, you may feel very weak or tired. Sudden, heavy bleeding means you need to see a doctor right away. This kind of bleeding can be very dangerous. But it can usually be cured or controlled. The doctor may do some tests to find the cause of your bleeding. Follow-up care is a key part of your treatment and safety. Be sure to make and go to all appointments, and call your doctor if you are having problems. It's also a good idea to know your test results and keep a list of the medicines you take. How can you care for yourself at home? · Be safe with medicines. Take your medicines exactly as prescribed. Call your doctor if you think you are having a problem with your medicine. You will get more details on the specific medicines your doctor prescribes. · Do not take aspirin or other anti-inflammatory medicines, such as naproxen (Aleve) or ibuprofen (Advil, Motrin), without talking to your doctor first. Ask your doctor if it is okay to use acetaminophen (Tylenol). · Do not drink alcohol. · The bleeding may make you lose iron. So it's important to eat foods that have a lot of iron. These include red meat, shellfish, poultry, and eggs. They also include beans, raisins, whole-grain breads, and leafy green vegetables. If you want help planning meals, you can make an appointment with a dietitian. When should you call for help? Call 911 anytime you think you may need emergency care.  For example, call if:    · You have sudden, severe belly pain.     · You vomit blood or what looks like coffee grounds.     · You passed out (lost consciousness).     · Your stools are maroon or very bloody. Call your doctor now or seek immediate medical care if:    · You are dizzy or lightheaded, or you feel like you may faint.     · Your stools are black and look like tar, or they have streaks of blood.     · You have belly pain.     · You vomit or have nausea.     · You have trouble swallowing, or it hurts when you swallow. Watch closely for changes in your health, and be sure to contact your doctor if:    · You do not get better as expected. Where can you learn more? Go to http://www.gray.com/  Enter F981 in the search box to learn more about \"Gastrointestinal Bleeding: Care Instructions. \"  Current as of: June 26, 2019               Content Version: 12.6  © 0058-9562 [employer]. Care instructions adapted under license by [employer] (which disclaims liability or warranty for this information). If you have questions about a medical condition or this instruction, always ask your healthcare professional. [place] [address] any warranty or liability for your use of this information. Patient Education        Anemia: Care Instructions  Your Care Instructions     Anemia is a low level of red blood cells, which carry oxygen throughout your body. Many things can cause anemia. Lack of iron is one of the most common causes. Your body needs iron to make hemoglobin, a substance in red blood cells that carries oxygen from the lungs to your body's cells. Without enough iron, the body produces fewer and smaller red blood cells. As a result, your body's cells do not get enough oxygen, and you feel tired and weak. And you may have trouble concentrating. Bleeding is the most common cause of a lack of iron.  You may have heavy menstrual bleeding or bleeding caused by conditions such as ulcers, hemorrhoids, or cancer. Regular use of aspirin or other anti-inflammatory medicines (such as ibuprofen) also can cause bleeding in some people. A lack of iron in your diet also can cause anemia, especially at times when the body needs more iron, such as during pregnancy, infancy, and the teen years. Your doctor may have prescribed iron pills. It may take several months of treatment for your iron levels to return to normal. Your doctor also may suggest that you eat foods that are rich in iron, such as meat and beans. There are many other causes of anemia. It is not always due to a lack of iron. Finding the specific cause of your anemia will help your doctor find the right treatment for you. Follow-up care is a key part of your treatment and safety. Be sure to make and go to all appointments, and call your doctor if you are having problems. It's also a good idea to know your test results and keep a list of the medicines you take. How can you care for yourself at home? · Take your medicines exactly as prescribed. Call your doctor if you think you are having a problem with your medicine. · If your doctor recommends iron pills, take them as directed:  ? Try to take the pills on an empty stomach about 1 hour before or 2 hours after meals. But you may need to take iron with food to avoid an upset stomach. ? Do not take antacids or drink milk or caffeine drinks (such as coffee, tea, or cola) at the same time or within 2 hours of the time that you take your iron. They can make it hard for your body to absorb the iron. ? Vitamin C (from food or supplements) helps your body absorb iron. Try taking iron pills with a glass of orange juice or some other food that is high in vitamin C, such as citrus fruits. ? Iron pills may cause stomach problems, such as heartburn, nausea, diarrhea, constipation, and cramps. Be sure to drink plenty of fluids, and include fruits, vegetables, and fiber in your diet each day.  Iron pills often make your bowel movements dark or green. ? If you forget to take an iron pill, do not take a double dose of iron the next time you take a pill. ? Keep iron pills out of the reach of small children. An overdose of iron can be very dangerous. · Follow your doctor's advice about eating iron-rich foods. These include red meat, shellfish, poultry, eggs, beans, raisins, whole-grain bread, and leafy green vegetables. · Steam vegetables to help them keep their iron content. When should you call for help? Call 911 anytime you think you may need emergency care. For example, call if:    · You have symptoms of a heart attack. These may include:  ? Chest pain or pressure, or a strange feeling in the chest.  ? Sweating. ? Shortness of breath. ? Nausea or vomiting. ? Pain, pressure, or a strange feeling in the back, neck, jaw, or upper belly or in one or both shoulders or arms. ? Lightheadedness or sudden weakness. ? A fast or irregular heartbeat. After you call 911, the  may tell you to chew 1 adult-strength or 2 to 4 low-dose aspirin. Wait for an ambulance. Do not try to drive yourself.     · You passed out (lost consciousness). Call your doctor now or seek immediate medical care if:    · You have new or increased shortness of breath.     · You are dizzy or lightheaded, or you feel like you may faint.     · Your fatigue and weakness continue or get worse.     · You have any abnormal bleeding, such as:  ? Nosebleeds. ? Vaginal bleeding that is different (heavier, more frequent, at a different time of the month) than what you are used to.  ? Bloody or black stools, or rectal bleeding. ? Bloody or pink urine. Watch closely for changes in your health, and be sure to contact your doctor if:    · You do not get better as expected. Where can you learn more? Go to http://www.Gigwalk.com/  Enter R301 in the search box to learn more about \"Anemia: Care Instructions. \"  Current as of: November 8, 2019               Content Version: 12.6  © 2021-9253 [employer]. Care instructions adapted under license by [employer] (which disclaims liability or warranty for this information). If you have questions about a medical condition or this instruction, always ask your healthcare professional. [address] any warranty or liability for your use of this information. Patient Education        Learning About Heart Failure  What is heart failure? Heart failure means that your heart muscle doesn't pump as much blood as your body needs. Failure doesn't mean that your heart has stopped. It means that your heart isn't pumping as well as it should. Because your heart cannot pump well, your body tries to make up for it. To do this:  · Your body holds on to salt and water. This increases the amount of blood in your bloodstream.  · Your heart beats faster. · Your heart might get bigger. Your body has an amazing ability to make up for heart failure. It may do such a good job that you don't know you have a disease. But at some point, your heart and body will no longer be able to keep up. Then fluid starts to build up in your lungs and other parts of your body. This fluid buildup is called congestion. It's why some doctors call the disease congestive heart failure. What can you expect when you have heart failure? Heart failure is a lifelong (chronic) disease. Treatment may be able to slow the disease and help you feel better. But heart failure tends to get worse over time. Despite this, there are many steps you can take to feel better and stay healthy longer. Early on, your symptoms may not be too bad. As heart failure gets worse, symptoms typically get worse, and you may need to limit your activities. Heart failure can also get worse suddenly. If this happens, you need emergency care.  Then, after treatment, your symptoms may go back to being stable (which means they stay the same) for a long time. Heart failure can lead to other health problems, such as heart rhythm problems. Over time, your treatment options may change, especially as your symptoms get worse. You may want to think about what kind of care you want at the end of your life. What are the symptoms? Symptoms of heart failure start to happen when your heart can't pump enough blood to the rest of your body. In the early stages of heart failure, you may:  · Feel tired easily. · Be short of breath when you exert yourself. · Feel like your heart is pounding or racing (palpitations). · Feel weak or dizzy. As heart failure gets worse, fluid starts to build up in your lungs and other parts of your body. This may cause you to:  · Feel short of breath even at rest.  · Have swelling (edema), especially in your legs, ankles, and feet. · Gain weight. This may happen over just a day or two, or more slowly. · Cough or wheeze, especially when you lie down. · Feel bloated or sick to your stomach. How is heart failure treated? Heart failure is treated with medicines and steps you take to make lifestyle changes and check your symptoms. Treatment can slow the disease and help you feel better and live longer. · You'll probably take several medicines. · You'll take steps to care for yourself at home. Yasmin Liam watch for changes in your symptoms. You may need to make lifestyle changes, such as limiting sodium, getting regular exercise, not smoking, and eating healthy foods. · You might attend cardiac rehabilitation (rehab) to get education and support that help you make lifestyle changes and stay as healthy as possible. · You may get a heart device. A pacemaker helps your heart pump blood. An ICD can stop abnormal heart rhythms. · As heart failure gets worse, palliative care can help improve your quality of life.  You can do advance care planning to decide what kind of care you want at the end of your life.  How can you care for yourself? There are many steps you can take to feel better and live longer. These steps are an important part of treatment. They can help you stay active and enjoy life. Take your medicine the right way. Avoid medicines that can make your symptoms worse. Check your weight and symptoms every day. Know what to do if your symptoms get worse. Limit sodium. This helps keep fluid from building up. It may help you feel better. Be active. Exercise regularly, but don't exercise too hard. Be heart-healthy. Eat healthy foods, stay at a healthy weight, limit alcohol, and don't smoke. Stay as healthy as possible. Manage other health problems such as diabetes and high blood pressure. Avoid colds and flu, get help for depression and anxiety, and manage stress. If you think you may have a problem with alcohol or drug use, talk to your doctor. Follow-up care is a key part of your treatment and safety. Be sure to make and go to all appointments, and call your doctor if you are having problems. It's also a good idea to know your test results and keep a list of the medicines you take. Where can you learn more? Go to http://www.gray.com/  Enter [id number] in the search box to learn more about \"Learning About Heart Failure. \"  Current as of: December 16, 2019               Content Version: 12.6  © 1368-5048 [employer]. Care instructions adapted under license by [employer] (which disclaims liability or warranty for this information). If you have questions about a medical condition or this instruction, always ask your healthcare professional. [place] [address] any warranty or liability for your use of this information.            DISCHARGE SUMMARY from Nurse    PATIENT INSTRUCTIONS:    After general anesthesia or intravenous sedation, for 24 hours or while taking prescription Narcotics:  · Limit your activities  · Do not drive and operate hazardous machinery  · Do not make important personal or business decisions  · Do  not drink alcoholic beverages  · If you have not urinated within 8 hours after discharge, please contact your surgeon on call. Report the following to your surgeon:  · Excessive pain, swelling, redness or odor of or around the surgical area  · Temperature over 100.5  · Nausea and vomiting lasting longer than 4 hours or if unable to take medications  · Any signs of decreased circulation or nerve impairment to extremity: change in color, persistent  numbness, tingling, coldness or increase pain  · Any questions    What to do at Home:  Recommended activity: Activity as tolerated    If you experience any of the following symptoms Nausea, Vomiting, Diarrhea, Fever greater than 100.5, Shortness of breath, Increase pain please follow up with PCP. *  Please give a list of your current medications to your Primary Care Provider. *  Please update this list whenever your medications are discontinued, doses are      changed, or new medications (including over-the-counter products) are added. *  Please carry medication information at all times in case of emergency situations. These are general instructions for a healthy lifestyle:    No smoking/ No tobacco products/ Avoid exposure to second hand smoke  Surgeon General's Warning:  Quitting smoking now greatly reduces serious risk to your health. Obesity, smoking, and sedentary lifestyle greatly increases your risk for illness    A healthy diet, regular physical exercise & weight monitoring are important for maintaining a healthy lifestyle    You may be retaining fluid if you have a history of heart failure or if you experience any of the following symptoms:  Weight gain of 3 pounds or more overnight or 5 pounds in a week, increased swelling in our hands or feet or shortness of breath while lying flat in bed.   Please call your doctor as soon as you notice any of these symptoms; do not wait until your next office visit. The discharge information has been reviewed with the patient. The patient verbalized understanding. Discharge medications reviewed with the patient and appropriate educational materials and side effects teaching were provided.   ___________________________________________________________________________________________________________________________________

## 2020-10-20 NOTE — PROGRESS NOTES
conducted an initial consultation and Spiritual Assessment for Javier Briceño, who is a 65 y. o.,male. Patient's Primary Language is: [place]. According to the patient's EMR Moravian Affiliation is: No preference. The reason the Patient came to the hospital is:   Patient Active Problem List    Diagnosis Date Noted    Goals of care, counseling/discussion     Systolic CHF, acute on chronic ([address]     Debility     Dizziness 10/15/2020    Anemia 10/15/2020    GIB (gastrointestinal bleeding) 10/15/2020        The  provided the following Interventions:  Introduced myself and initiated a relationship of care and support. Explored issues of juan, belief, spirituality and Judaism/ritual needs while hospitalized. Listened empathically as patient shared about being discharged soon. Hopefully by tomorrow. Patient likes to cook(he's a ) and is looking forward to doing that. Provided information about Spiritual Care Services. The following outcomes where achieved:  Patient shared limited information about both their medical narrative. Patient processed feeling about current hospitalization. Patient expressed gratitude for 's visit. Patient has great support. Assessment:  Patient does not have any Judaism/cultural needs that will affect patient's preferences in health care. Plan:  Chaplains will continue to follow and will provide pastoral care on an as needed/requested basis.       [address] [place] [address]   [phone]

## 2020-10-20 NOTE — PROGRESS NOTES
Problem: Falls - Risk of  Goal: *Absence of Falls  Description: Document Kimberly Yash Fall Risk and appropriate interventions in the flowsheet. Outcome: Progressing Towards Goal  Note: Fall Risk Interventions:  Mobility Interventions: Patient to call before getting OOB    Mentation Interventions: Adequate sleep, hydration, pain control, Update white board    Medication Interventions: Evaluate medications/consider consulting pharmacy, Bed/chair exit alarm, Patient to call before getting OOB, Teach patient to arise slowly    Elimination Interventions: Call light in reach    History of Falls Interventions: Bed/chair exit alarm, Door open when patient unattended         Problem: Nutrition Deficit  Goal: *Optimize nutritional status  Outcome: Progressing Towards Goal     Problem: Pressure Injury - Risk of  Goal: *Prevention of pressure injury  Description: Document Marcus Scale and appropriate interventions in the flowsheet. Outcome: Progressing Towards Goal  Note: Pressure Injury Interventions:       Moisture Interventions: Absorbent underpads    Activity Interventions: Pressure redistribution bed/mattress(bed type), Increase time out of bed    Mobility Interventions: HOB 30 degrees or less    Nutrition Interventions: Document food/fluid/supplement intake, Offer support with meals,snacks and hydration    Friction and Shear Interventions: Minimize layers                Problem: Pressure Injury - Risk of  Goal: *Prevention of pressure injury  Description: Document Marcus Scale and appropriate interventions in the flowsheet.   Outcome: Progressing Towards Goal  Note: Pressure Injury Interventions:       Moisture Interventions: Absorbent underpads    Activity Interventions: Pressure redistribution bed/mattress(bed type), Increase time out of bed    Mobility Interventions: HOB 30 degrees or less    Nutrition Interventions: Document food/fluid/supplement intake, Offer support with meals,snacks and hydration    Friction and Shear Interventions: Minimize layers

## 2020-10-20 NOTE — DIABETES MGMT
Glycemic Control Plan of Care      Assessment: admitted with GI bleed - follow up today - he is sitting up in recliner - no complaints. States he will be going home tomorrow afternoon - confirms he takes lantus 10 units daily at home. Typically eats breakfast daily and says he \"grazes throughout the day\". Monitors BG twice daily.     No discharge needs -       Most recent blood glucose values:      Current A1C:  From EMR A1c on 9/24/2020 = 6.7%    Current hospital diabetes medications  Lantus 5 units daily  Corrective lispro, very insulin resistant, 4 times daily    TTD previous day =  17 units  5 units lantus  12 units anisha Marsh MPH RN CDE  Pager [phone]

## 2020-10-20 NOTE — PROGRESS NOTES
[facility] - Progress Note  Admit Date: 10/15/2020    Assessment:       -Acute symptomatic blood loss anemia requiring transfusions.   -Acute upper GI bleed, EGD 10/14/20 revealed bleeding duodenal ulcer. On ASA and Plavix as outpatient for recent PCI. -CAD s/p CABG 1990s (LIMA to LAD, SVG to RCA, SVG to OM/PLV) with recent anterior wall  STEMI requiring PCI to LIMA to LAD at the anastomosis site when he was found of 100% thrombotic occlusion with JHONATHAN (2.25 x 16 mm) 9/23/2020 (known chronically occluded SVG to RCA, known 30% disease at anastomotic site of SVG to OM/PLV)  -Ischemic CMY with EF 25% by echo 10/1/2020 (15% 9/23/2020, 50% 7/2020). Plan for outpatient discussion of AICD, was not fitted for lifevest.  -ESRD recently started on HD following recent heart catheterization, possible cardiorenal syndrome versus/contrast induced SKYLER/ATN in the setting of diuresis. Patient reports this is hopefully temporary.  -Chronic HFrEF. -Chronic anemia. Known recent anemia with heme negative stool at [facility]. -Hypertension.  -Dyslipidemia. -Diabetes mellitus. -COPD. -Tobacco use. -Poor dentition. Recent extraction.     Primary cardiologist Dr. Jasbir Flor. Plan:     Patient feeling well today, wishing to go home as soon as possible. Have discussed lifevest with patient. He reports he would like to wait and discuss with his primary cardiologist in follow up. He reports he has had previous conversations regarding possible AICD with Dr. Hunter Shah and Dr. Jasbir Flor in the past.  Continue volume management per nephrology team. Esther List to make good urine despite suboptimally documented input/output. Awaiting follow up renal function, but hopefully will not require further HD. Continued on plavix. Resume ASA when ok from GI standpoint. No ace or arb given renal function. I saw, examined, and evaluated the patient.   I personally reviewed the patient's labs, tests, vitals, orders, medications, updated history, and other providers assessments. I personally agree with the findings as stated and the plan as documented. Subjective:     No SOB. No CP     Objective:      Patient Vitals for the past 8 hrs:   Temp Pulse Resp BP SpO2   10/20/20 0748 98.9 °F (37.2 °C) (!) 109 19 120/76 99 %   10/20/20 0524 98.1 °F (36.7 °C) (!) 109 20 119/72 98 %         Patient Vitals for the past 96 hrs:   Weight   10/19/20 0431 87.4 kg (192 lb 12 oz)   10/18/20 0607 87.5 kg (193 lb)   10/17/20 1349 87.1 kg (192 lb)                    Intake/Output Summary (Last 24 hours) at 10/20/2020 0943  Last data filed at 10/20/2020 5519  Gross per 24 hour   Intake 240 ml   Output 450 ml   Net -210 ml       Physical Exam:  General:  alert, cooperative, no distress, appears stated age  Neck:  no JVD  Lungs:  clear to auscultation bilaterally  Heart:  regular rate and rhythm  Abdomen:  abdomen is soft without significant tenderness, masses, organomegaly or guarding  Extremities:  extremities normal, atraumatic, no cyanosis trace edema    Data Review:     Labs: Results:       Chemistry Recent Labs     10/19/20  0518   *      K 3.4*      CO2 26   BUN 33*   CREA 5.82*   CA 8.1*   MG 1.5*   PHOS 3.6   AGAP 11   BUCR 6*      CBC w/Diff Recent Labs     10/20/20  0120 10/19/20  1722 10/19/20  0518   WBC  --   --  10.3   RBC  --   --  2.62*   HGB 8.1* 8.6* 8.0*   HCT 24.2* 26.2* 24.7*   PLT  --   --  134*   GRANS  --   --  80*   LYMPH  --   --  10*   EOS  --   --  0      Cardiac Enzymes No results found for: CPK, CK, CKMMB, CKMB, RCK3, CKMBT, CKNDX, CKND1, SAYRA, TROPT, TROIQ, RAVEN, TROPT, TNIPOC, BNP, BNPP   Coagulation No results for input(s): PTP, INR, APTT, INREXT in the last 72 hours.     Lipid Panel Lab Results   Component Value Date/Time    Cholesterol, total 89 11/19/2013 11:33 AM    HDL Cholesterol 29 (L) 11/19/2013 11:33 AM    LDL, calculated 47.6 11/19/2013 11:33 AM    VLDL, calculated 12.4 11/19/2013 11:33 AM    Triglyceride 62 11/19/2013 11:33 AM    CHOL/HDL Ratio 3.1 11/19/2013 11:33 AM      BNP No results found for: BNP, BNPP, XBNPT   Liver Enzymes No results for input(s): TP, ALB, TBIL, AP in the last 72 hours.     No lab exists for component: SGOT, GPT, DBIL   Digoxin    Thyroid Studies No results found for: T4, T3U, TSH, TSHEXT       Signed By: MANDI Santiago     October 20, 2020

## 2020-10-20 NOTE — PROGRESS NOTES
Important Message from Medicare\" reviewed and explained with the patient via telephone. Mr. Ino Perales stated he understand his right to appeal, but he is ready to go home. He is just waiting for the final word from the doctor, so that he can call his ride. A signed verbal copy provided to patient/representative. Original signed verbal document placed in patient's chart.

## 2020-10-20 NOTE — PROGRESS NOTES
Problem: Falls - Risk of  Goal: *Absence of Falls  Description: Document Efren Zurita Fall Risk and appropriate interventions in the flowsheet. 10/20/2020 1611 by Jennifer Cobb  Outcome: Resolved/Met  Note: Fall Risk Interventions:  Mobility Interventions: Patient to call before getting OOB    Mentation Interventions: Adequate sleep, hydration, pain control, Update white board    Medication Interventions: Evaluate medications/consider consulting pharmacy, Bed/chair exit alarm, Patient to call before getting OOB, Teach patient to arise slowly    Elimination Interventions: Call light in reach    History of Falls Interventions: Bed/chair exit alarm, Door open when patient unattended      10/20/2020 1411 by Jennifer Cobb  Outcome: Progressing Towards Goal  Note: Fall Risk Interventions:  Mobility Interventions: Patient to call before getting OOB    Mentation Interventions: Adequate sleep, hydration, pain control, Update white board    Medication Interventions: Evaluate medications/consider consulting pharmacy, Bed/chair exit alarm, Patient to call before getting OOB, Teach patient to arise slowly    Elimination Interventions: Call light in reach    History of Falls Interventions: Bed/chair exit alarm, Door open when patient unattended         Problem: Patient Education: Go to Patient Education Activity  Goal: Patient/Family Education  Outcome: Resolved/Met     Problem: Nutrition Deficit  Goal: *Optimize nutritional status  10/20/2020 1611 by Randal CANALES  Outcome: Resolved/Met  10/20/2020 1411 by Jennifer Cobb  Outcome: Progressing Towards Goal     Problem: Pressure Injury - Risk of  Goal: *Prevention of pressure injury  Description: Document Marcus Scale and appropriate interventions in the flowsheet.   10/20/2020 1611 by Jennifer Cobb  Outcome: Resolved/Met  Note: Pressure Injury Interventions:       Moisture Interventions: Absorbent underpads    Activity Interventions: Pressure redistribution bed/mattress(bed type), Increase time out of bed    Mobility Interventions: HOB 30 degrees or less    Nutrition Interventions: Document food/fluid/supplement intake, Offer support with meals,snacks and hydration    Friction and Shear Interventions: Minimize layers             10/20/2020 1411 by Theresa Alvarado  Outcome: Progressing Towards Goal  Note: Pressure Injury Interventions:       Moisture Interventions: Absorbent underpads    Activity Interventions: Pressure redistribution bed/mattress(bed type), Increase time out of bed    Mobility Interventions: HOB 30 degrees or less    Nutrition Interventions: Document food/fluid/supplement intake, Offer support with meals,snacks and hydration    Friction and Shear Interventions: Minimize layers                Problem: Patient Education: Go to Patient Education Activity  Goal: Patient/Family Education  Outcome: Resolved/Met     Problem: Patient Education: Go to Patient Education Activity  Goal: Patient/Family Education  Outcome: Resolved/Met     Problem: Patient Education: Go to Patient Education Activity  Goal: Patient/Family Education  Outcome: Resolved/Met

## 2020-10-20 NOTE — DISCHARGE SUMMARY
Discharge Summary     Patient ID:  Tanya Cho  [id number]  79 y.o.  1950  Body mass index is 28.46 kg/m². PCP on record: Alan Fernandez DO    Admit date: 10/15/2020  Discharge date and time: 10/20/2020    Discharge Diagnoses:                                            1 anemia of acute blood loss  2 peptic ulcer disease with GI bleed-melena  3 CAD-recent restenosis of graft  4 SKYLER-on CKD 4  5 hypertension  6 HLD  7 tobacco use  8 diabetes mellitus type 2  9 chronic systolic heart failure-EF 25%        Consults: Cardiology, GI and Nephrology          Hospital Course by problems:    Patient with CAD and recent JHONATHAN stent placement admitted with GI bleed resulting from peptic ulcer disease s/p endoscopy ( Duodenum/jejunum: duodenal ulcer w/ adherent clot oozing BRB) and status post - 3  Cc of 1: 10k epin injected. Bicap applied and bleeding ceased . -High risk of re bleed-it was observed in the hospital-resolved Plavix almost 5 days however very high risk acute thrombosis of his JHONATHAN stent of dual antiplatelets-at this point we have to restart his dual antiplatelet therapy with outpatient follow-up on CBC either PCP or cardiology.    -Patient will continue lifetime use of PPI-Simmons twice daily PPI and Carafate    -Strong recommendation to stop use of tobacco    -She was offered Vest -by cardiologist here but he wants to consult his primary cardiologist before any further treatment. -Due to renal insufficiency patient is not on ARB or ACE inhibitor    -Post catheterization prior to this hospitalization patient had developed acute kidney injury -leading to required hemodialysis, during this admission he stabilized without hemodialysis of hemodialysis catheter was removed. Patient seen and examined by me on discharge day.   Pertinent Findings:  Stable for discharge    Significant Diagnostic Studies:  [address] [place], [place] [id number]      Endoscopic Gastroduodenoscopy Procedure Note     Zoya Alexandra  1950  [id number]     Indication:  Melena/hematochezia      : Xenia Collins MD     Referring Provider:  Annel Leyva DO     Anesthesia/Sedation:  MAC anesthesia Propofol      Procedure Details      After infomed consent was obtained for the procedure, with all risks and benefits of procedure explained the patient was taken to the endoscopy suite and placed in the left lateral decubitus position. Following sequential administration of sedation as per above, the endoscope was inserted into the mouth and advanced under direct vision to third portion of the duodenum. A careful inspection was made as the gastroscope was withdrawn, including a retroflexed view of the proximal stomach; findings and interventions are described below.       Findings:   Esophagus:normal  Stomach: normal   Duodenum/jejunum: duodenal ulcer w/ adherent clot oozing BRB     Therapies:  3  Cc of 1: 10k epin injected. Bicap applied and bleeding ceased      Specimens: * No specimens in log *           Complications:   None; patient tolerated the procedure well.     EBL:  None. Impression:    Dudonenal Ulcer (actively bleeding)       Recommendations:  -Continue acid suppression. , -Keep NPO except ice chip, -No NSAIDS  carafate suspension  Check stool for H pylori  Hold Plavix for at least 2-4 days  Check stool for H pylori, Rx if +  Xenia Collins MD  10/15/2020  3:50 PM       Pertinent Lab Data:  Recent Labs     10/20/20  1010 10/20/20  0120 10/19/20  1722 10/19/20  0518   WBC  --   --   --  10.3   HGB 8.6* 8.1* 8.6* 8.0*   HCT 26.0* 24.2* 26.2* 24.7*   PLT  --   --   --  134*     Recent Labs     10/20/20  1010 10/19/20  0518    138   K 3.6 3.4*    101   CO2 26 26   * 169*   BUN 28* 33*   CREA 4.54* 5.82*   CA 8.2* 8.1*   MG  --  1.5*   PHOS 3.2 3.6   ALB 3.2*  --        DISCHARGE MEDICATIONS:   @  Current Discharge Medication List      START taking these medications    Details   pantoprazole (PROTONIX) 40 mg tablet Take 1 Tab by mouth Before breakfast and dinner. Qty: 60 Tab, Refills: 1      sucralfate (CARAFATE) 1 gram tablet Take 1 Tab by mouth daily. Indications: an ulcer of the duodenum  Qty: 10 Tab, Refills: 0      OTHER CBC every Monday and Wednesday - for 4 occurrence and send lab results to PCP  Qty: 1 Act, Refills: 0         CONTINUE these medications which have NOT CHANGED    Details   aspirin delayed-release 81 mg tablet Take 81 mg by mouth daily. atorvastatin (LIPITOR) 80 mg tablet Take 80 mg by mouth daily. albuterol sulfate (PROAIR RESPICLICK) 90 mcg/actuation breath activated inhaler Take 2 Puffs by inhalation every four (4) hours as needed for Wheezing. albuterol-ipratropium (DUO-NEB) 2.5 mg-0.5 mg/3 ml nebu 3 mL by Nebulization route every four (4) hours as needed for Wheezing. carvediloL (COREG) 6.25 mg tablet Take 6.25 mg by mouth two (2) times daily (with meals). clopidogreL (PLAVIX) 75 mg tab Take 75 mg by mouth daily. cyanocobalamin 1,000 mcg tablet Take 1,000 mcg by mouth daily. fluticasone furoate-vilanteroL (Breo Ellipta) 100-25 mcg/dose inhaler Take 1 Puff by inhalation daily. fluticasone propionate (FLOVENT DISKUS) 50 mcg/actuation inhaler Take 1 Puff by inhalation. guaiFENesin ER (MUCINEX) 600 mg ER tablet Take 600 mg by mouth two (2) times a day. insulin glargine (Lantus U-100 Insulin) 100 unit/mL injection 10 Units by SubCUTAneous route nightly. isosorbide mononitrate ER (IMDUR) 30 mg tablet Take 30 mg by mouth daily. loratadine (CLARITIN) 10 mg tablet Take 10 mg by mouth daily. nicotinic acid (NIACIN) 500 mg tablet Take 2,500 mg by mouth two (2) times daily (with meals). nicotine (Nicoderm CQ) 21 mg/24 hr 1 Patch by TransDERmal route every twenty-four (24) hours.       multivit-min/folic/vit K/lycop (ONE-A-DAY MEN'S MULTIVITAMIN PO) Take 1 Tab by mouth daily. polyethylene glycol (Miralax) 17 gram/dose powder Take 17 g by mouth daily. STOP taking these medications       clindamycin (CLEOCIN) 300 mg capsule Comments:   Reason for Stopping:         famotidine (PEPCID) 20 mg tablet Comments:   Reason for Stopping:                 My Recommended Diet, Activity, Wound Care, and follow-up labs are listed in the patient's Discharge Insturctions which I have personally completed and reviewed. Disposition:     [x] Home with family   Patient refused home health     Condition at Discharge:  Stable    Follow up with:   PCP : Andrew Oshea, DO      Please follow-up tests/labs that are still pendin. None  2.    >30 minutes spent coordinating this discharge (review instructions/follow-up, prescriptions, preparing report for sign off)    Disclaimer: Sections of this note are dictated utilizing voice recognition software, which may have resulted in some phonetic based errors in grammar and contents. Even though attempts were made to correct all the mistakes, some may have been missed, and remained in the body of the document. If questions arise, please contact our department.     Signed:  Merari Cool MD  10/20/2020  2:39 PM

## 2020-10-20 NOTE — PROGRESS NOTES
Problem: Mobility Impaired (Adult and Pediatric)  Goal: *Acute Goals and Plan of Care (Insert Text)  Description: Physical Therapy Goals  Initiated 10/18/2020 and to be accomplished within 7 day(s)    1. Patient will transfer from bed to chair and chair to bed with modified independence using the least restrictive device. 2.  Patient will perform sit to stand with modified independence. 3.  Patient will ambulate with modified independence for 150 feet with the least restrictive device. To prepare pt for home mobility. PLOF:  Pt lives with his partner in a 1 story apartment with no stairs to enter. Prior to this hospitalization pt was ambulating in home and community with no AD but notes he has been becoming progressively weaker and fatigues more easily over the past year. Outcome: Progressing Towards Goal   PHYSICAL THERAPY TREATMENT    Patient: Argelia Anderson (65 y.o. male)  Date: 10/20/2020  Diagnosis: GIB (gastrointestinal bleeding) [K92.2]  Anemia [D64.9]  Dizziness [R42]   GIB (gastrointestinal bleeding)  Procedure(s) (LRB):  ENDOSCOPY w/duodenal ulcer coagulation and epinephrine injection (N/A) 5 Days Post-Op  Precautions: Fall, Aspiration  PLOF: see above    ASSESSMENT:  Patient received reclined in bed, awake and alert, agreeable to physical therapy treatment. Patient on room air, +telemetry. Denied pain. Denied dizziness. Completed bed mobility with supervision and good efficiency with task. Intact sitting balance at edge of bed. Completed sit<>stand transfers with standby assistance with verbal cues for safe hand placement. Patient instructed in standing marches x 10 with RW with SBA piror to ambulation as warm up. Patient ambulated with PT guidance x 70 feet with CGA to standby assistance and required one standing rest break. SpO2 97-99% on room air with activity. HR elevated up to 132 bpm with activity and returned to 127 bpm when returned to sitting at edge of bed after ambulation. Verbal cues provided for upright posture and safety with turns with walker. Nurse came in room at end of session for meds. Patient transferred from bed to chair with standby assistance with RW x 5 feet. Patient left sitting in bedside chair, nurse in room, phlebotomist in room, call bell in reach, all needs met, in no apparent distress. Vitals at rest:  bpm, SpO2 98%, /69 mmHg. Progression toward goals:   [x]      Improving appropriately and progressing toward goals  []      Improving slowly and progressing toward goals  []      Not making progress toward goals and plan of care will be adjusted     PLAN:  Patient continues to benefit from skilled intervention to address the above impairments. Continue treatment per established plan of care. Discharge Recommendations:  Home Health and family support  Further Equipment Recommendations for Discharge:  shower chair; has rollator at home, per patient     SUBJECTIVE:   Patient stated I want to go home tomorrow. I just can't eat the food here.     OBJECTIVE DATA SUMMARY:   Critical Behavior:  Neurologic State: Alert  Orientation Level: Oriented X4  Cognition: Follows commands  Safety/Judgement: Fall prevention  Functional Mobility Training:  Bed Mobility:     Supine to Sit: Supervision  Sit to Supine: Supervision  Transfers:  Sit to Stand: Stand-by assistance  Stand to Sit: Stand-by assistance  Balance:  Sitting: Intact  Standing: Impaired; With support  Standing - Static: Good  Standing - Dynamic : Fair(+)   Range Of Motion:   AROM: Within functional limits  Ambulation/Gait Training:  Distance (ft): 70 Feet (ft)  Assistive Device: Walker, rolling  Ambulation - Level of Assistance: Contact guard assistance     Gait Description (WDL): Exceptions to WDL  Gait Abnormalities: Decreased step clearance     Therapeutic Exercises:   Standing marches x 10 with CGA with RW      Pain:  Pain level pre-treatment: 0/10  Pain level post-treatment: 0/10   Pain Intervention(s): Medication (see MAR); Rest,  Repositioning   Response to intervention: Nurse notified, See doc flow    Activity Tolerance:   good  Please refer to the flowsheet for vital signs taken during this treatment. After treatment:   [x] Patient left in no apparent distress sitting up in chair  [] Patient left in no apparent distress in bed  [x] Call bell left within reach  [x] Nursing notified  [] Caregiver present  [] Bed alarm activated  [] SCDs applied      COMMUNICATION/EDUCATION:   [x]         Role of Physical Therapy in the acute care setting. [x]         Fall prevention education was provided and the patient/caregiver indicated understanding. [x]         Patient/family have participated as able in working toward goals and plan of care. []         Patient/family agree to work toward stated goals and plan of care. []         Patient understands intent and goals of therapy, but is neutral about his/her participation.   []         Patient is unable to participate in stated goals/plan of care: ongoing with therapy staff.  []         Other:        Valdez Woo , PT, DPT   Time Calculation: 29 mins

## 2020-10-20 NOTE — PROGRESS NOTES
Problem: Self Care Deficits Care Plan (Adult)  Goal: *Acute Goals and Plan of Care (Insert Text)  Description: Occupational Therapy Goals  Initiated 10/19/2020 within 7 day(s). 1.  Patient will perform upper body dressing with independence. 2.  Patient will perform lower body dressing with independence. 3.  Patient will perform functional activities in standing with G balance Independently. 4.  Patient will perform toilet transfers with independence. 5.  Patient will perform all aspects of toileting with independence. 6.  Patient will participate in upper extremity therapeutic exercise/activities with independence for 7 minutes. 7.  Patient will utilize energy conservation techniques during functional activities with verbal cues. Prior Level of Function:  Pt was previously independent with all ADLs without the use of AE/DME. Outcome: Progressing Towards Goal   OCCUPATIONAL THERAPY TREATMENT    Patient: David Montgomery (25 y.o. male)  Date: 10/20/2020  Diagnosis: GIB (gastrointestinal bleeding) [K92.2]  Anemia [D64.9]  Dizziness [R42]   GIB (gastrointestinal bleeding)  Procedure(s) (LRB):  ENDOSCOPY w/duodenal ulcer coagulation and epinephrine injection (N/A) 5 Days Post-Op  Precautions: Fall, Aspiration  PLOF: Pt was previously independent with all ADLs without the use of AE/DME. Chart, occupational therapy assessment, plan of care, and goals were reviewed. ASSESSMENT:  Pt presented sitting EOB upon therapist arrival and agreeable for tx at this time. Pt reports he has been getting up unsupervised to go to restroom, pt educated at length importance of utilizing call bell for assist to reduce fall risk. Pt performed STS transfer SBA with RW in prep for functional task. Pt maneuvered to bathroom ~ 10 ft with RW to increased functional activity tolerance needed for self cares. Pt engaged in grooming task brushing hair and washing face with (I) with 1/0 UE support.  Pt returned self to sitting EOB SBA with RW and demonstrated LB dressing task doffing/donning socks with bend forward method. Provided pt with EC/WS technique and strategies to increase (I) and safety during all ADL tasks. Pt performed functional bed mobility sit-supine SBA wutg HOB elevated and all needs left within reach. Progression toward goals:  [x]          Improving appropriately and progressing toward goals  []          Improving slowly and progressing toward goals  []          Not making progress toward goals and plan of care will be adjusted     PLAN:  Patient continues to benefit from skilled intervention to address the above impairments. Continue treatment per established plan of care. Discharge Recommendations: [facility]  Further Equipment Recommendations for Discharge: N/A     SUBJECTIVE:   Patient stated  I am so ready to go home. \"    OBJECTIVE DATA SUMMARY:   Cognitive/Behavioral Status:  Neurologic State: Alert  Orientation Level: Oriented X4  Cognition: Appropriate decision making  Safety/Judgement: Fall prevention, Home safety    Functional Mobility and Transfers for ADLs:   Bed Mobility:        Sit to Supine: Supervision      Transfers:  Sit to Stand: Stand-by assistance  Stand to Sit: Stand-by assistance          Balance:  Sitting: Intact  Standing: Impaired; With support  Standing - Static: Good  Standing - Dynamic : Fair(+)    ADL Intervention:       Grooming  Grooming Assistance: Independent  Position Performed: Standing  Washing Face: Independent  Brushing/Combing Hair: Independent           Lower Body Dressing Assistance  Dressing Assistance: Stand-by assistance  Socks: Stand-by assistance  Leg Crossed Method Used: No  Position Performed: Bending forward method       Pain:  Pt reports not pain at this time. Activity Tolerance:    Fair  Please refer to the flowsheet for vital signs taken during this treatment.   After treatment:   [x]  Patient left in no apparent distress sitting up EOB  []  Patient left in no apparent distress in bed  [x]  Call bell left within reach  [x]  Nursing notified  []  Caregiver present  [x]  Bed alarm activated    COMMUNICATION/EDUCATION:   [] Role of Occupational Therapy in the acute care setting  [x] Home safety education was provided and the patient/caregiver indicated understanding. [x] Patient/family have participated as able in working towards goals and plan of care. [] Patient/family agree to work toward stated goals and plan of care. [] Patient understands intent and goals of therapy, but is neutral about his/her participation. [] Patient is unable to participate in goal setting and plan of care.       Thank you for this referral.  AKIL Nathan  Time Calculation: 25 mins

## 2020-10-21 ENCOUNTER — PATIENT OUTREACH (OUTPATIENT)
Dept: CASE MANAGEMENT | Age: 70
End: 2020-10-21

## 2020-10-21 NOTE — PROGRESS NOTES
Date/Time:  10/21/2020 9:12 AM   Call within 2 business days of discharge: Yes   Attempted to reach patient by telephone. Left HIPPA compliant message requesting a return call. Will attempt to reach patient again.

## 2021-05-11 NOTE — ED NOTES
Gave patient report to Jackie barriga RN of day shift. [facility]  Hospitalist Discharge Summary      Date of Admission:  5/3/2021  Date of Discharge:  5/11/2021  Discharging Provider: Pati Kohler MD  Discharge Team: Hospitalist Service, Gold 11    Discharge Diagnoses   Septic shock 2/2 strep mitis bacteremia, resolved  SBP  Acute cholangitis s/p ERCP on 5/5 and 5/10  Hepatic abscess  Acute decompensated cirrhosis likely secondary to SBP  Primary sclerosing cholangitis with chronic cirrhosis  Recurrent ascites  Grade III esophageal varices not banded  GONZALO, resolved  NAGMA, resolved  Severe malnutrition  History of Ulcerative Colitis    Follow-ups Needed After Discharge   Follow-up Appointments     Follow Up and recommended labs and tests      Please keep your follow up appointments as scheduled    Infectious disease will contact you to make an appointment, if you haven't   heard from them in a week please call Call [phone]          The GI team will call you to schedule an appointment for repeat ERCP in ~1 month.    Unresulted Labs Ordered in the Past 30 Days of this Admission     Date and Time Order Name Status Description    5/12/2021 0940 Cancer antigen 19-9 In process     5/6/2021 1640 Anaerobic bacterial culture Preliminary       These results will be followed up by PCP, ID Dr. Morocho.    Discharge Disposition   Discharged to home  Condition at discharge: Stable    Hospital Course   Bertaalfredo Nava is a 55 year old with hx of PSC with cirrhosis c/b ascites and esophageal varices, UC on mesalamine, biliary strictures s/p stenting (1/6/21), pancreatic mucinous cystadenoma, and currently undergoing liver transplant evaluation who presented to [facility] with septic shock due to S. Mitis bacteremia and SBP, now s/p ERCP on 5/5 and 5/10, each with biliary stent exchange and persistence of stones and pus in biliary tree as well as ~2 cm fluid collection suggestive of abscess. ID recommending IV antibiotic  therapy on discharge duration pending results of planned ERCP in ~4 weeks.     Septic shock 2/2 strep mitis bacteremia, resolved  SBP  Acute cholangitis s/p ERCP on 5/5, 5/10 w/ biliary stent exchange x2 and debris clearance  Hepatic abscess  Admitted to [facility] 4/29 with septic shock from strep mitis bacteremia and SBP. Paracentesis 4/29 with 1610 WBCs, and 60% neutrophils.  She was stabilized with fluids and abx and transferred to [facility]. Underwent ERCP 5/5 w/ one stent removed, one stent placed and large amounts of stone and pus removed suggestive of cholangitis. Also noted to have possible ~ 2 cm abscess/collection off of the left intrahepatic that could not be accessed. ID consulted and recommended a repeat RUQ US which showed some decrease in collections noted prior. A repeat ERCP was performed on 5/10 in the hopes of facilitating internal drainage from hepatic fluid collection; this was not achieved but the biliary stent was exchanged and persistent stones and pus were also removed.     Currently, the antibiotic plan per ID is to continue CTX and Flagyl with the duration of therapy remaining unclear; currently, the plan is to continue IV antibiotics until at least the time of next ERCP in 4 weeks. Patient will obtain weekly labs while on antibiotic therapy. ID (Dr. Morocho) to follow-up with patient in 2 weeks and after ERCP results to determine duration of treatment. Given strep mitis bacteremia, pt would also benefit from outpatient follow-up with dental clinic - she has already planned to schedule this.    Acute decompensated cirrhosis likely secondary to SBP  Primary sclerosing cholangitis with chronic cirrhosis  Recurrent ascites  Grade III esophageal varices not banded  Ascending cholangitis w/ possible hepatic abscess   Hepatology was consulted during this admission for further transplant work-up and management of cirrhosis. Pt was started on low-dose diuretic therapy which was not adjusted further  given concerns for sepsis. She will need Ciprofloxacin for SBP prophylaxis after her course of CTX/Flagyl is completed. She was started on the Hep A and B vaccination series while inpatient. She will need an outpatient mammogram for further transplant work-up. She has follow-up scheduled in Hepatology clinic after discharge on 6/21.    Of note, patient has outpatient appointment for therapeutic paracentesis scheduled for 5/14 which she intends to keep.    Consultations This Hospital Stay   GI HEPATOLOGY ADULT IP CONSULT  NUTRITION SERVICES ADULT IP CONSULT  PHYSICAL THERAPY ADULT IP CONSULT  OCCUPATIONAL THERAPY ADULT IP CONSULT  GI LUMINAL ADULT IP CONSULT  VASCULAR ACCESS CARE ADULT IP CONSULT  INFECTIOUS DISEASE GENERAL ADULT IP CONSULT  PATIENT LEARNING CENTER IP CONSULT  INTERNAL MEDICINE PROCEDURE TEAM ADULT IP CONSULT [place] - [facility]  VASCULAR ACCESS FOR PICC PLACEMENT ADULT IP CONSULT  INTERVENTIONAL RADIOLOGY ADULT/PEDS IP CONSULT  INTERNAL MEDICINE PROCEDURE TEAM ADULT IP CONSULT [facility] - [facility]    Code Status   Full Code    Time Spent on this Encounter   I, Pati Kohler MD, personally saw the patient today and spent greater than 30 minutes discharging this patient.       Pati Kohler MD  [facility] [address] [address]  Phone: [phone]  ______________________________________________________________________    Physical Exam   Vital Signs: Temp: 98.4  F (36.9  C) Temp src: Oral BP: 113/71 Pulse: 80   Resp: 16 SpO2: 97 % O2 Device: None (Room air)    Weight: 118 lbs 6.4 oz  General Appearance: Pleasant, conversant, no distress, very eager for discharge home.  Respiratory: Breathing comfortably on room air, lungs clear to auscultation bilaterally.  Cardiovascular: RRR, no m/r/g. Radial pulses 2+ and symmetric.  GI: softly distended, no significant tenderness to palpation, no rebound/guarding.  Skin: no skin rash,  tal present.  Other: Trace pitting edema bilaterally.        Primary Care Physician   [facility]    Discharge Orders      CBC with platelets    Last Lab Result: Hemoglobin (g/dL)       Date                     Value                 05/10/2021               11.5 (L)         ----------     Comprehensive metabolic panel     CRP inflammation     Home infusion referral      Care Coordination Referral      Reason for your hospital stay    You were in the hospital with sepsis and bacteremia due to an infection around your gallbladder     Follow Up and recommended labs and tests    Please keep your follow up appointments as scheduled    Infectious disease will contact you to make an appointment, if you haven't heard from them in a week please call Call [phone]     Activity    Your activity upon discharge: activity as tolerated     Diet    Follow this diet upon discharge: Orders Placed This Encounter      Snacks/Supplements Adult: Other; Ensure Enlive Shake (chocolate) at HS snack time; Between Meals      Regular Diet Adult       Significant Results and Procedures   Results for orders placed or performed during the hospital encounter of 05/03/21   US Abdomen Limited    Narrative    EXAMINATION: Limited Abdominal Ultrasound, 5/4/2021 8:49 AM     COMPARISON: CT abdomen and pelvis without contrast 4/29/2021.  Ultrasound abdomen 4/29/2021 and 3/23/2021. MRI abdomen 4/22/2021.    HISTORY: Worsening liver function tests in patient with decompensated  cirrhosis, history of cholangitis and biliary stent.    FINDINGS:   Fluid: Small volume of ascites.    Liver: The liver demonstrates markedly coarsened heterogeneous  echotexture, measuring 16.8 cm in craniocaudal dimension. Distorted  anatomy secondary to underlying chronic liver disease. Perihepatic  ascites present. There is a septated cystic-appearing area in the  posterior right hepatic lobe measuring 5.2 x 3.7 x 3.9 cm. No internal  vascularity. On CT,  there is a hypoattenuating 6.8 x 5 cm collection  in this region. Additional markedly dilated ducts and possible  collections in the caudate and central right hepatic lobe not well  visualized sonographically. On MRI there are dilated ducts in this  region with a dilated portion measuring up to 2.7 cm. The biliary tree  is not well evaluated sonographically in the liver due to marked  heterogeneity. Main portal vein is patent with antegrade flow.    Gallbladder: Gallbladder is slightly distended. Layering sludge and  stones better visualized on CT and MRI. There is no positive  sonographic Javier's sign. Gallbladder wall is mildly thickened at 4  mm with some mild edema likely reactive given liver disease and  ascites.    Bile Ducts: Both the intra- and extrahepatic biliary system are of  normal caliber.  The common bile duct measures 4 mm in diameter.  Biliary stent not well-visualized sonographically.    Pancreas: Limited visualization. Nodule adjacent to the pancreatic  head measuring 2.7 x 1.3 x 2.5 cm likely a lymph node when correlated  to prior CT and MRI. These have been previously biopsied as sarcoid  when correlated to prior MRI.    Kidney: The right kidney measures 12.4 cm long. There is no  hydronephrosis or hydroureter, no shadowing renal calculi, cystic  lesion or mass.       Impression    IMPRESSION:   1.  Changes from hepatic cirrhosis with marked heterogeneity and  distorted anatomy.  2.  Complex septated cystic collection in the right hepatic lobe  demonstrated measuring up to 5.2 cm. This appears to have increased in  size from MRI where there was a severely dilated duct in this region  measuring up to 2.7 cm and correlates with a hypoattenuating area on  CT. Given cholangitis history, this is suspicious for an intrahepatic  abscess. Additional collections have also increased in size/are new  from MRI (where there were severely dilated ducts and debris) and are  noted on CT. Limited assessment of  these areas sonographically.  Consider close follow-up MRI/MRCP as clinically warranted.  3.  Enlarged lymph node adjacent to the pancreas. This previously  biopsied as sarcoid.  4.  Common bile duct not significantly distended. Biliary stent not  well shown sonographically.  5.  Ascites redemonstrated, decreased in volume given paracentesis  5/3/2021.    VIDHYA TREADWELL MD   CT Dental wo Contrast    Narrative    CT DENTAL WO CONTRAST 5/5/2021 10:53 AM    History:  evaluate for abscess given strep mitis bacteremia    Comparison:  None available      TECHNIQUE: 3-D reconstruction by the technologists, with curved  multiplanar reformat of thin section imaging through the mandible and  maxilla obtained without intravenous contrast.    FINDINGS:  No significant soft tissue swelling or mass. Normal facial bone  alignment. No bony erosion. Large dental caries of left maxillary  second premolar without significant periapical disease. The orbits are  unremarkable.    Visualized portions of the paranasal sinuses are clear. Degenerative  changes of the right temporomandibular joint with mild mandibular head  subchondral erosion. The left temporomandibular joint is normal.      Impression    IMPRESSION:  No periapical abscess. Large dental caries of the left maxillary  second premolar.    I have personally reviewed the examination and initial interpretation  and I agree with the findings.    HAZEL PAK MD   XR Surgery CARRIE Fluoro L/T 5 Min w Stills    Narrative    This exam was marked as non-reportable because it will not be read by a   radiologist or a [place] non-radiologist provider.         US Abdomen Limited    Narrative    EXAMINATION: US ABDOMEN COMPLETE   5/7/2021 9:09 AM      HISTORY: Evaluate for resolution of possible abscess after ERCP    COMPARISON: Ultrasound abdomen 5/4/2021, 5/3/2021    TECHNIQUE: The abdomen was scanned in standard fashion with  specialized ultrasound transducer(s) using both  gray-scale and limited  color Doppler techniques.    FINDINGS: The liver has a diffusely coarsened echotexture and measures  15.5 cm in the craniocaudal dimension. Moderate ascites, slightly  increased from previous. Septated cystic lesion in the posterior right  hepatic lobe measuring 4.0 x 4.5 x 4.1 cm, previously 5.2 x 3.7 x 3.9  cm. The pancreas is not well visualized on this exam. Gallbladder  appears enlarged with wall thickening measuring up to 5.5 mm.  Sonographic Javier's sign is negative. There is intraluminal sludge  but no gallstones. The walls of the common bile duct are not well seen  due to interval placement of common bile duct stent. The aorta and IVC  are normal. The right kidney is partially obscured on today's exam.      Impression    IMPRESSION:  1. Cirrhotic changes of the liver.  2. Complex septated cystic lesion in the right hepatic lobe appears  slightly smaller in size from previous exam.  3. The gallbladder appears enlarged with wall thickening and  intraluminal sludge likely related to underlying liver disease. No  focal tenderness to suggest acute cholecystitis. A common bile duct  stent is visualized on today's exam.  3. Moderate volume ascites, slightly increased from previous exam.    I have personally reviewed the examination and initial interpretation  and I agree with the findings.    MARTINEZ JAVED MD   CT Abdomen Pelvis w Contrast    Narrative    EXAMINATION: CT ABDOMEN PELVIS W CONTRAST, 5/8/2021 8:50 AM    TECHNIQUE:  Helical CT images from the lung bases through the  symphysis pubis were obtained with contrast.  Coronal reformatted  images were generated at a workstation for further assessment.    Contrast: iopamidol (ISOVUE-370) solution 95 mL        COMPARISON: CT 4/29/2021 and MRI of 4/22/2021    HISTORY: Abdominal abscess/infection suspected    FINDINGS:    Abdomen and pelvis:   Liver: Significantly cirrhotic morphology of the liver similar to  prior. Previously  demonstrated multifocal dilatation of the biliary  ducts containing sludge and large communicating cystic areas in the  caudate lobe and segments 5 and 7 which are stable compared to CT from  4/29/2021 but increased in size compared to MRI from 4/22/2021. For  example cystic area in segment 7 measures 6.2 cm compared to 2.4 cm on  4/22/2021 MRI. Redemonstration of right hepatic biliary stent  terminating in the duodenum. Previous left hepatic stent removed.  Portal veins appear patent. New pneumobilia surrounding the biliary  stent as well as extending into the cystic duct. Distended gallbladder  with wall thickening and new development of air-fluid level.  Spleen: Normal size.  Pancreas: No suspicious pancreatic lesions. The pancreatic duct is not  dilated.  Adrenal glands: Left adrenal 1 cm nodule. Right adrenal gland is  unremarkable.  Kidneys: No kidney masses. No hydronephrosis or obstructing renal  stones.  Bladder / Pelvic organs: Unremarkable.  Bowel: No bowel distention. Mild wall thickening of the small bowel  and sigmoid colon likely relates to portal hypertensive  enteropathy/colopathy.  Lymph nodes: No retroperitoneal, mesenteric, or pelvic  lymphadenopathy.  Fluid: Diffuse large volume ascites increased from prior.  Vessels: No infrarenal aortic aneurysm. Diffuse portosystemic  collaterals.    Lung bases: No consolidation or pleural effusion.    Bones and soft tissues: No suspicious osseous lesions.      Impression    IMPRESSION:   1.  Cirrhotic liver with multifocal dilatation of the biliary ducts  containing sludge and large communicating cystic areas in the caudate  lobe and segment 7, consistent with the known diagnosis of PSC.  The  large cystic areas in the caudate lobe, segment 5 and 7 may represent  cholangitic abscesses given the rapid increase in size from 4/22/2021.  The segment 5 and caudate lobe ducts appear not to communicate with  the stented right hepatic duct.   2.  Sequela of portal  portal hypertension including portosystemic  collaterals and increased large volume ascites.  3.  Right hepatic biliary stent in place. Previous left hepatic duct  stent removed. Interval development of pneumobilia and air within  gallbladder likely relates to the recent endoscopic procedure.  4.  Indeterminate left adrenal nodule, characterized as cyst on prior  MRI.     I have personally reviewed the examination and initial interpretation  and I agree with the findings.    MARTINEZ JAVED MD   XR Surgery CARRIE G/T 5 Min Fluoro w Stills    Narrative    This exam was marked as non-reportable because it will not be read by a   radiologist or a [place] non-radiologist provider.         Echo Complete    Narrative    [id number]  [id number]  [id number]  [id number]^[id number]^EULOGIO     [facility]  Echocardiography [facility]  [address] [place] [id number]     Name: ESTUARDO JOYCE  MRN: [id number]  : 1965  Study Date: 2021 01:08 PM  Age: 55 yrs  Gender: Female  Patient Location: [facility]  Reason For Study: Endocarditis  Ordering Physician: EULOGIO LUJAN  Referring Physician: SHERRON FAIRBANKS  Performed By: LANCE Harding     BSA: 1.8 m2  Height: 65 in  Weight: 158 lb  BP: 128/91 mmHg  ______________________________________________________________________________  Procedure  Complete Portable Echo Adult.  ______________________________________________________________________________  Interpretation Summary  Global and regional left ventricular function is normal with an EF of 60-65%.  Global right ventricular function is normal. The right ventricle is normal  size.  No significant valvular abnormalities.  IVC diameter <2.1 cm collapsing >50% with sniff suggests a normal RA pressure  of 3 mmHg.  Ascites is noted.  This study was compared with the study from 2021 (resting tomograms from  stress study). No significant changes  noted.  ______________________________________________________________________________  Left Ventricle  Global and regional left ventricular function is normal with an EF of 60-65%.  Left ventricular wall thickness is normal. Left ventricular size is normal.  Left ventricular diastolic function is indeterminate.     Right Ventricle  Global right ventricular function is normal. The right ventricle is normal  size.     Atria  Both atria appear normal.     Mitral Valve  The mitral valve is normal. Trace mitral insufficiency is present.     Aortic Valve  The aortic valve is tricuspid. On Doppler interrogation, there is no  significant stenosis or regurgitation.     Tricuspid Valve  The tricuspid valve is normal. Trace tricuspid insufficiency is present.  Pulmonary artery systolic pressure cannot be assessed.     Pulmonic Valve  The valve leaflets are not well visualized. Trace pulmonic insufficiency is  present.     Vessels  Sinuses of Valsalva 3.0 cm. Ascending aorta 3.5 cm. IVC diameter <2.1 cm  collapsing >50% with sniff suggests a normal RA pressure of 3 mmHg.     Pericardium  No pericardial effusion is present.     Miscellaneous  Ascites is noted.     Compared to Previous Study  This study was compared with the study from 2021 (resting tomograms from  stress study) . No significant changes noted.  ______________________________________________________________________________  MMode/2D Measurements & Calculations  IVSd: 0.79 cm  LVIDd: 4.5 cm  LVIDs: 2.6 cm  LVPWd: 0.79 cm  FS: 41.4 %  LV mass(C)d: 110.9 grams  LV mass(C)dI: 62.0 grams/m2  Ao root diam: 3.0 cm  asc Aorta Diam: 3.5 cm  LVOT diam: 2.3 cm  LVOT area: 4.2 cm2  LA Volume (BP): 55.6 ml     LA Volume Index (BP): 31.1 ml/m2  RWT: 0.35     Doppler Measurements & Calculations  MV E max christiano: 66.0 cm/sec  MV A max christiano: 90.0 cm/sec  MV E/A: 0.73  MV dec slope: 284.0 cm/sec2  MV dec time: 0.23 sec  PA V2 max: 103.0 cm/sec  PA max P.2 mmHg  PA acc time:  0.14 sec  E/E' av.7  Lateral E/e': 6.7  Medial E/e': 10.6     ______________________________________________________________________________  Report approved by: Ho Dunlap 2021 02:08 PM               Discharge Medications   Discharge Medication List as of 2021 10:20 AM      START taking these medications    Details   carvedilol (COREG) 6.25 MG tablet Take 1 tablet (6.25 mg) by mouth 2 times daily (with meals), Disp-60 tablet, R-3, E-Prescribe         CONTINUE these medications which have CHANGED    Details   cefTRIAXone (ROCEPHIN) 2 GM vial Inject 2 g into the vein every 24 hours, Disp-700 mL, R-0, E-Prescribe      metroNIDAZOLE (FLAGYL) 500 MG tablet Take 1 tablet (500 mg) by mouth 3 times daily, Disp-105 tablet, R-0, E-Prescribe         CONTINUE these medications which have NOT CHANGED    Details   calcium carbonate 600 mg-vitamin D 400 units (CALTRATE) 600-400 MG-UNIT per tablet Take 1 tablet by mouth daily, Historical      furosemide (LASIX) 20 MG tablet Take 20 mg by mouth daily, Historical      mesalamine (LIALDA) 1.2 g EC tablet Take 2,400 mg by mouth daily (with breakfast), Historical      multivitamin (CENTRUM SILVER) tablet Take 1 tablet by mouth daily, Historical      potassium chloride ER (MICRO-K) 10 MEQ CR capsule Take 10 mEq by mouth At Bedtime, Historical      simvastatin (ZOCOR) 20 MG tablet Take 20 mg by mouth At Bedtime, Historical      spironolactone (ALDACTONE) 50 MG tablet Take 50 mg by mouth daily, Historical      ursodiol (ACTIGALL) 300 MG capsule Take 300 mg by mouth 2 times daily, Historical         STOP taking these medications       lisinopril (ZESTRIL) 10 MG tablet Comments:   Reason for Stopping:         nadolol (CORGARD) 20 MG tablet Comments:   Reason for Stopping:             Allergies   Allergies   Allergen Reactions     Diagnostic X[employer] Hives     PATIENT HAD HIVE REACTION AFTER ADMINISTERING CT CONTRAST DYE.       Contrast Dye

## 2022-03-18 PROBLEM — R42 DIZZINESS: Status: ACTIVE | Noted: 2020-10-15

## 2022-03-19 PROBLEM — D64.9 ANEMIA: Status: ACTIVE | Noted: 2020-10-15

## 2022-03-20 PROBLEM — K92.2 GIB (GASTROINTESTINAL BLEEDING): Status: ACTIVE | Noted: 2020-10-15

## 2023-05-12 RX ORDER — ATORVASTATIN CALCIUM 80 MG/1
80 TABLET, FILM COATED ORAL DAILY
COMMUNITY

## 2023-05-12 RX ORDER — CARVEDILOL 6.25 MG/1
TABLET ORAL 2 TIMES DAILY WITH MEALS
COMMUNITY

## 2023-05-12 RX ORDER — PANTOPRAZOLE SODIUM 40 MG/1
TABLET, DELAYED RELEASE ORAL
COMMUNITY
Start: 2020-10-20

## 2023-05-12 RX ORDER — POLYETHYLENE GLYCOL 3350 17 G/17G
17 POWDER, FOR SOLUTION ORAL DAILY
COMMUNITY

## 2023-05-12 RX ORDER — GUAIFENESIN 600 MG/1
TABLET, EXTENDED RELEASE ORAL 2 TIMES DAILY
COMMUNITY

## 2023-05-12 RX ORDER — SUCRALFATE 1 G/1
TABLET ORAL DAILY
COMMUNITY
Start: 2020-10-20

## 2023-05-12 RX ORDER — FLUTICASONE FUROATE AND VILANTEROL 100; 25 UG/1; UG/1
1 POWDER RESPIRATORY (INHALATION) DAILY
COMMUNITY

## 2023-05-12 RX ORDER — ASPIRIN 81 MG/1
81 TABLET ORAL DAILY
COMMUNITY

## 2023-05-12 RX ORDER — NICOTINE 21 MG/24HR
1 PATCH, TRANSDERMAL 24 HOURS TRANSDERMAL EVERY 24 HOURS
COMMUNITY

## 2023-05-12 RX ORDER — LORATADINE 10 MG/1
10 TABLET ORAL DAILY
COMMUNITY

## 2023-05-12 RX ORDER — INSULIN GLARGINE 100 [IU]/ML
INJECTION, SOLUTION SUBCUTANEOUS
COMMUNITY

## 2023-05-12 RX ORDER — CLOPIDOGREL BISULFATE 75 MG/1
75 TABLET ORAL DAILY
COMMUNITY

## 2023-05-12 RX ORDER — NIACIN 500 MG
TABLET ORAL 2 TIMES DAILY WITH MEALS
COMMUNITY

## 2023-05-12 RX ORDER — IPRATROPIUM BROMIDE AND ALBUTEROL SULFATE 2.5; .5 MG/3ML; MG/3ML
SOLUTION RESPIRATORY (INHALATION) EVERY 4 HOURS PRN
COMMUNITY

## 2023-05-12 RX ORDER — ISOSORBIDE MONONITRATE 30 MG/1
30 TABLET, EXTENDED RELEASE ORAL DAILY
COMMUNITY

## (undated) DEVICE — BIPOLAR ELECTROHEMOSTASIS CATHETER: Brand: INJECTION GOLD PROBE

## (undated) DEVICE — SYRINGE MED 25GA 3ML L5/8IN SUBQ PLAS W/ DETACH NDL SFTY

## (undated) DEVICE — SOLUTION IRRIG 1000ML H2O STRL BLT

## (undated) DEVICE — SYR 20ML LL STRL LF --

## (undated) DEVICE — BITE BLOCK ENDOSCP UNIV AD 6 TO 9.4 MM

## (undated) DEVICE — ENDOSCOPY PUMP TUBING/ CAP SET: Brand: ERBE

## (undated) DEVICE — SYR 50ML SLIP TIP NSAF LF STRL --

## (undated) DEVICE — FLUFF AND POLYMER UNDERPAD,EXTRA HEAVY: Brand: WINGS

## (undated) DEVICE — MEDI-VAC SUCTION HIGH CAPACITY: Brand: CARDINAL HEALTH

## (undated) DEVICE — GAUZE,SPONGE,4"X4",16PLY,STRL,LF,10/TRAY: Brand: MEDLINE

## (undated) DEVICE — AIRLIFE™ NASAL OXYGEN CANNULA CURVED, FLARED TIP WITH 14 FOOT (4.3 M) CRUSH-RESISTANT TUBING, OVER-THE-EAR STYLE: Brand: AIRLIFE™

## (undated) DEVICE — BASIN EMESIS 500CC ROSE 250/CS 60/PLT: Brand: MEDEGEN MEDICAL PRODUCTS, LLC

## (undated) DEVICE — MEDI-VAC NON-CONDUCTIVE SUCTION TUBING: Brand: CARDINAL HEALTH

## (undated) DEVICE — FLEX ADVANTAGE 3000CC: Brand: FLEX ADVANTAGE

## (undated) DEVICE — STERILE POLYISOPRENE POWDER-FREE SURGICAL GLOVES: Brand: PROTEXIS

## (undated) DEVICE — CATHETER SUCT TR FL TIP 14FR W/ O CTRL